# Patient Record
Sex: FEMALE | Race: WHITE | Employment: FULL TIME | URBAN - METROPOLITAN AREA
[De-identification: names, ages, dates, MRNs, and addresses within clinical notes are randomized per-mention and may not be internally consistent; named-entity substitution may affect disease eponyms.]

---

## 2017-04-05 ENCOUNTER — OFFICE VISIT (OUTPATIENT)
Dept: OBGYN CLINIC | Facility: CLINIC | Age: 27
End: 2017-04-05

## 2017-04-05 VITALS
DIASTOLIC BLOOD PRESSURE: 62 MMHG | BODY MASS INDEX: 19.38 KG/M2 | SYSTOLIC BLOOD PRESSURE: 94 MMHG | WEIGHT: 122 LBS | HEIGHT: 66.5 IN | HEART RATE: 84 BPM

## 2017-04-05 DIAGNOSIS — Z12.39 BREAST CANCER SCREENING: ICD-10-CM

## 2017-04-05 DIAGNOSIS — T83.32XA IUD STRINGS LOST: ICD-10-CM

## 2017-04-05 DIAGNOSIS — Z12.4 CERVICAL CANCER SCREENING: ICD-10-CM

## 2017-04-05 DIAGNOSIS — Z11.3 SCREENING FOR STD (SEXUALLY TRANSMITTED DISEASE): ICD-10-CM

## 2017-04-05 DIAGNOSIS — Z01.419 ENCOUNTER FOR GYNECOLOGICAL EXAMINATION WITHOUT ABNORMAL FINDING: Primary | ICD-10-CM

## 2017-04-05 PROCEDURE — 87491 CHLMYD TRACH DNA AMP PROBE: CPT | Performed by: OBSTETRICS & GYNECOLOGY

## 2017-04-05 PROCEDURE — 87591 N.GONORRHOEAE DNA AMP PROB: CPT | Performed by: OBSTETRICS & GYNECOLOGY

## 2017-04-05 PROCEDURE — 99395 PREV VISIT EST AGE 18-39: CPT | Performed by: OBSTETRICS & GYNECOLOGY

## 2017-04-05 RX ORDER — ZOLPIDEM TARTRATE 10 MG/1
TABLET ORAL
Refills: 0 | COMMUNITY
Start: 2017-03-02 | End: 2017-10-27

## 2017-04-05 NOTE — PROGRESS NOTES
GYN H&P     2017  2:22 PM    CC: Patient presents with:  Physical: Annual, patient with c/o pinching sensation with IUD, also tired all the time.  Also some pelvic pain 2 weeks post menses      HPI: patient is a 27year old  here for her annual g for Wheezing. Disp: 1 Inhaler Rfl: 0     No current facility-administered medications on file prior to visit.   Family History   Problem Relation Age of Onset   • Other[other] [OTHER] Father      blood disorder   • Psychiatric Father    • Cancer Mother RRR  BREASTS: soft, nontendder, no palpable masses or nodes, no nipple discharge, no skin changes, no axillary adenopathy  ABDOMEN: Soft, non distended; non tender, no masses  GYNE/: External Genitalia: Normal appearing, no lesions.  Urethral meatus appea

## 2017-04-10 NOTE — PROGRESS NOTES
Quick Note:    Pt advised of negative gc/chl.  Pt has apt 4/21/17 for u/s and f/u with Dr. Estrellita Sanchez  And would like to have IUD removed at this apt and start  Ocp's. Will review with Dr. Estrellita Sanchez and f/u with patient if she needs  Separate apt.      Dr. Heather Weiner

## 2017-04-21 ENCOUNTER — OFFICE VISIT (OUTPATIENT)
Dept: OBGYN CLINIC | Facility: CLINIC | Age: 27
End: 2017-04-21

## 2017-04-21 ENCOUNTER — APPOINTMENT (OUTPATIENT)
Dept: OBGYN CLINIC | Facility: CLINIC | Age: 27
End: 2017-04-21

## 2017-04-21 VITALS
HEART RATE: 69 BPM | WEIGHT: 122 LBS | HEIGHT: 66 IN | SYSTOLIC BLOOD PRESSURE: 94 MMHG | DIASTOLIC BLOOD PRESSURE: 60 MMHG | BODY MASS INDEX: 19.61 KG/M2

## 2017-04-21 DIAGNOSIS — T83.32XA IUD STRINGS LOST: ICD-10-CM

## 2017-04-21 DIAGNOSIS — Z30.432 ENCOUNTER FOR IUD REMOVAL: Primary | ICD-10-CM

## 2017-04-21 PROCEDURE — 76830 TRANSVAGINAL US NON-OB: CPT | Performed by: OBSTETRICS & GYNECOLOGY

## 2017-04-21 PROCEDURE — 99212 OFFICE O/P EST SF 10 MIN: CPT | Performed by: OBSTETRICS & GYNECOLOGY

## 2017-04-21 RX ORDER — LEVONORGESTREL AND ETHINYL ESTRADIOL 0.1-0.02MG
1 KIT ORAL DAILY
Qty: 1 PACKAGE | Refills: 3 | Status: SHIPPED | OUTPATIENT
Start: 2017-04-21 | End: 2017-10-27

## 2017-04-21 NOTE — PROGRESS NOTES
GYN H&P     2017  11:33 AM    CC: Patient presents with: Other: pt is here to go over ggyn ultrasound      HPI: patient is a 27year old  here for Patient presents with:   Other: pt is here to go over ggyn ultrasound      Pt here for IUD remova Name: N/A    Years of Education: N/A  Number of Children: N/A     Occupational History  None on file     Social History Main Topics   Smoking status: Never Smoker     Smokeless tobacco: Never Used    Alcohol Use: No    Comment: Rare 1 x month    Drug Use: diagnosis)        1. Encounter for IUD removal  -iud removed w/o complication  -rx for lutera, pt advised to use back up for 1 wk  2. IUD strings lost    - Transvaginal [employer] Only [id number];  Future  - Transvaginal [place] [facility] Only [[id number]]      Piyush Gomez MD

## 2017-05-01 ENCOUNTER — TELEPHONE (OUTPATIENT)
Dept: OBGYN CLINIC | Facility: CLINIC | Age: 27
End: 2017-05-01

## 2017-05-01 NOTE — TELEPHONE ENCOUNTER
Patient started birth control 2 1/2 weeks ago. She noticed she was getting nauseous about 2 hours after she takes it. She changed to taking it at night and she woke up the past few nights nauseous around 2:30 AM.  She is requesting to change.  Please advi

## 2017-05-02 RX ORDER — NORETHINDRONE ACETATE AND ETHINYL ESTRADIOL 1; .02 MG/1; MG/1
1 TABLET ORAL DAILY
Qty: 1 PACKAGE | Refills: 11 | Status: SHIPPED | OUTPATIENT
Start: 2017-05-02 | End: 2017-10-27

## 2017-05-02 NOTE — TELEPHONE ENCOUNTER
Patient informed. To start new medication when previous pack ends. Verbalized understanding. No further questions or concerns at this time.

## 2017-10-23 ENCOUNTER — TELEPHONE (OUTPATIENT)
Dept: OBGYN CLINIC | Facility: CLINIC | Age: 27
End: 2017-10-23

## 2017-10-23 DIAGNOSIS — O20.9 BLEEDING IN EARLY PREGNANCY: Primary | ICD-10-CM

## 2017-10-23 NOTE — TELEPHONE ENCOUNTER
Pending NOB 10/27/17. Pt reports pink/brown spotting starting yesterday; mild cramping. [place] one week ago. Labs ordered per protocol; blood type A pos. Pt advised to keep appt on 10/27/17.   Pt advised to call for any pain or increase in bleeding

## 2017-10-24 ENCOUNTER — LAB ENCOUNTER (OUTPATIENT)
Dept: LAB | Age: 27
End: 2017-10-24
Attending: OBSTETRICS & GYNECOLOGY
Payer: COMMERCIAL

## 2017-10-24 DIAGNOSIS — O20.9 BLEEDING IN EARLY PREGNANCY: ICD-10-CM

## 2017-10-24 PROCEDURE — 36415 COLL VENOUS BLD VENIPUNCTURE: CPT | Performed by: OBSTETRICS & GYNECOLOGY

## 2017-10-24 PROCEDURE — 84702 CHORIONIC GONADOTROPIN TEST: CPT | Performed by: OBSTETRICS & GYNECOLOGY

## 2017-10-24 PROCEDURE — 84144 ASSAY OF PROGESTERONE: CPT | Performed by: OBSTETRICS & GYNECOLOGY

## 2017-10-25 ENCOUNTER — TELEPHONE (OUTPATIENT)
Dept: OBGYN CLINIC | Facility: CLINIC | Age: 27
End: 2017-10-25

## 2017-10-25 DIAGNOSIS — Z32.01 POSITIVE PREGNANCY TEST: Primary | ICD-10-CM

## 2017-10-25 NOTE — PROGRESS NOTES
Patient informed of results. Verbalized understanding. Order entered. No further questions or concerns.

## 2017-10-25 NOTE — TELEPHONE ENCOUNTER
Pt is calling back for labs done yesterday.  She starts work at [employer] and gave verbal permission to leave results on vm

## 2017-10-26 ENCOUNTER — LAB ENCOUNTER (OUTPATIENT)
Dept: LAB | Age: 27
End: 2017-10-26
Attending: OBSTETRICS & GYNECOLOGY
Payer: COMMERCIAL

## 2017-10-26 DIAGNOSIS — Z32.01 POSITIVE PREGNANCY TEST: ICD-10-CM

## 2017-10-26 PROCEDURE — 36415 COLL VENOUS BLD VENIPUNCTURE: CPT | Performed by: OBSTETRICS & GYNECOLOGY

## 2017-10-26 PROCEDURE — 84702 CHORIONIC GONADOTROPIN TEST: CPT | Performed by: OBSTETRICS & GYNECOLOGY

## 2017-10-26 NOTE — PROGRESS NOTES
Please inform patient that although beta HCG did not increase, this is most likely to be consistent with viable pregnancy in advance gestational age. Recommend to keep OB appointment.

## 2017-10-26 NOTE — PROGRESS NOTES
Contacted patient and reported results. She states understanding and will keep appt scheduled for tomorrow.

## 2017-10-27 ENCOUNTER — OFFICE VISIT (OUTPATIENT)
Dept: OBGYN CLINIC | Facility: CLINIC | Age: 27
End: 2017-10-27

## 2017-10-27 VITALS
HEIGHT: 66 IN | BODY MASS INDEX: 20.25 KG/M2 | DIASTOLIC BLOOD PRESSURE: 56 MMHG | SYSTOLIC BLOOD PRESSURE: 98 MMHG | WEIGHT: 126 LBS | HEART RATE: 70 BPM

## 2017-10-27 DIAGNOSIS — Z36.9 ENCOUNTER FOR ANTENATAL SCREENING OF MOTHER: ICD-10-CM

## 2017-10-27 DIAGNOSIS — Z34.81 ENCOUNTER FOR SUPERVISION OF OTHER NORMAL PREGNANCY, FIRST TRIMESTER: Primary | ICD-10-CM

## 2017-10-27 DIAGNOSIS — Z98.891 HISTORY OF CESAREAN SECTION: ICD-10-CM

## 2017-10-27 PROCEDURE — 87086 URINE CULTURE/COLONY COUNT: CPT | Performed by: OBSTETRICS & GYNECOLOGY

## 2017-10-27 NOTE — PROGRESS NOTES
[id number] white female for NOB visit. Hx of two prior  sections after uncomplicated  course. Did TOLAC with second pregnancy. Here in April for IUD removal. Cycles Q 30 to 32 days. Some spotting initially. Some nausea but no emesis.  Nothing

## 2017-11-14 ENCOUNTER — LAB ENCOUNTER (OUTPATIENT)
Dept: LAB | Age: 27
End: 2017-11-14
Attending: OBSTETRICS & GYNECOLOGY
Payer: COMMERCIAL

## 2017-11-14 DIAGNOSIS — Z36.9 ENCOUNTER FOR ANTENATAL SCREENING OF MOTHER: ICD-10-CM

## 2017-11-14 PROCEDURE — 87389 HIV-1 AG W/HIV-1&-2 AB AG IA: CPT | Performed by: OBSTETRICS & GYNECOLOGY

## 2017-11-14 PROCEDURE — 86901 BLOOD TYPING SEROLOGIC RH(D): CPT | Performed by: OBSTETRICS & GYNECOLOGY

## 2017-11-14 PROCEDURE — 86762 RUBELLA ANTIBODY: CPT | Performed by: OBSTETRICS & GYNECOLOGY

## 2017-11-14 PROCEDURE — 86900 BLOOD TYPING SEROLOGIC ABO: CPT | Performed by: OBSTETRICS & GYNECOLOGY

## 2017-11-14 PROCEDURE — 36415 COLL VENOUS BLD VENIPUNCTURE: CPT | Performed by: OBSTETRICS & GYNECOLOGY

## 2017-11-14 PROCEDURE — 86850 RBC ANTIBODY SCREEN: CPT | Performed by: OBSTETRICS & GYNECOLOGY

## 2017-11-14 PROCEDURE — 85025 COMPLETE CBC W/AUTO DIFF WBC: CPT | Performed by: OBSTETRICS & GYNECOLOGY

## 2017-11-14 PROCEDURE — 87340 HEPATITIS B SURFACE AG IA: CPT | Performed by: OBSTETRICS & GYNECOLOGY

## 2017-11-14 PROCEDURE — 86780 TREPONEMA PALLIDUM: CPT | Performed by: OBSTETRICS & GYNECOLOGY

## 2017-11-21 ENCOUNTER — OFFICE VISIT (OUTPATIENT)
Dept: OBGYN CLINIC | Facility: CLINIC | Age: 27
End: 2017-11-21

## 2017-11-21 VITALS
BODY MASS INDEX: 20.47 KG/M2 | HEIGHT: 66 IN | HEART RATE: 105 BPM | SYSTOLIC BLOOD PRESSURE: 120 MMHG | DIASTOLIC BLOOD PRESSURE: 60 MMHG | WEIGHT: 127.38 LBS

## 2017-11-21 DIAGNOSIS — Z34.81 ENCOUNTER FOR SUPERVISION OF OTHER NORMAL PREGNANCY, FIRST TRIMESTER: Primary | ICD-10-CM

## 2017-11-21 DIAGNOSIS — Z98.891 HISTORY OF CESAREAN SECTION: ICD-10-CM

## 2017-11-21 NOTE — PROGRESS NOTES
AMY  Doing well  No complaints.  No LOF/VB/uctx  RH pos  Declined flu vaccine today  Genetic testing declined (first, quad/AFP)  Anatomy Scan  (18-20weeks)

## 2017-12-04 ENCOUNTER — HOSPITAL ENCOUNTER (EMERGENCY)
Age: 27
Discharge: HOME | End: 2017-12-04
Attending: EMERGENCY MEDICINE
Payer: COMMERCIAL

## 2017-12-04 ENCOUNTER — TELEPHONE (OUTPATIENT)
Dept: OBGYN CLINIC | Facility: CLINIC | Age: 27
End: 2017-12-04

## 2017-12-04 VITALS
WEIGHT: 125 LBS | DIASTOLIC BLOOD PRESSURE: 60 MMHG | OXYGEN SATURATION: 98 % | RESPIRATION RATE: 17 BRPM | BODY MASS INDEX: 20.09 KG/M2 | HEIGHT: 66 IN | HEART RATE: 72 BPM | TEMPERATURE: 98 F | SYSTOLIC BLOOD PRESSURE: 95 MMHG

## 2017-12-04 DIAGNOSIS — R00.2 PALPITATIONS: Primary | ICD-10-CM

## 2017-12-04 DIAGNOSIS — E86.0 DEHYDRATION: ICD-10-CM

## 2017-12-04 DIAGNOSIS — Z3A.13 13 WEEKS GESTATION OF PREGNANCY: ICD-10-CM

## 2017-12-04 PROCEDURE — 80053 COMPREHEN METABOLIC PANEL: CPT | Performed by: EMERGENCY MEDICINE

## 2017-12-04 PROCEDURE — 99284 EMERGENCY DEPT VISIT MOD MDM: CPT

## 2017-12-04 PROCEDURE — 99285 EMERGENCY DEPT VISIT HI MDM: CPT

## 2017-12-04 PROCEDURE — 84484 ASSAY OF TROPONIN QUANT: CPT

## 2017-12-04 PROCEDURE — 93010 ELECTROCARDIOGRAM REPORT: CPT

## 2017-12-04 PROCEDURE — 81003 URINALYSIS AUTO W/O SCOPE: CPT | Performed by: EMERGENCY MEDICINE

## 2017-12-04 PROCEDURE — 93005 ELECTROCARDIOGRAM TRACING: CPT

## 2017-12-04 PROCEDURE — 83735 ASSAY OF MAGNESIUM: CPT | Performed by: EMERGENCY MEDICINE

## 2017-12-04 PROCEDURE — 96360 HYDRATION IV INFUSION INIT: CPT

## 2017-12-04 PROCEDURE — 85025 COMPLETE CBC W/AUTO DIFF WBC: CPT

## 2017-12-04 PROCEDURE — 84484 ASSAY OF TROPONIN QUANT: CPT | Performed by: EMERGENCY MEDICINE

## 2017-12-04 PROCEDURE — 80053 COMPREHEN METABOLIC PANEL: CPT

## 2017-12-04 PROCEDURE — 85025 COMPLETE CBC W/AUTO DIFF WBC: CPT | Performed by: EMERGENCY MEDICINE

## 2017-12-04 NOTE — TELEPHONE ENCOUNTER
Patient called c/o heart flutters and palpitations. States it has been happening since Friday and has continued to get worse. No shortness of breath, some dizziness. Also with H/A + nausea today.   Denies any vaginal bleeding, cramping, LOF, increase in d

## 2017-12-04 NOTE — ED PROVIDER NOTES
Patient Seen in: [facility] [place]    History   Patient presents with:  Arrythmia/Palpitations (cardiovascular)    Stated Complaint: palpitations, lightheaded- 13 weeks pregnant    HPI    Palpitations lightheaded and headache upon lightheaded- 13 weeks pregnant  Other systems are as noted in HPI. Constitutional and vital signs reviewed. All other systems reviewed and negative except as noted above.     Physical Exam   ED Triage Vitals  BP: 104/64 [12/04/17 1138]  Pulse: 81 [12/ Reviewed   COMP METABOLIC PANEL (14) - Abnormal; Notable for the following:        Result Value    Glucose 106 (*)     Creatinine 0.53 (*)     AST 11 (*)     Alt 11 (*)     Albumin 3.2 (*)     All other components within normal limits   URINALYSIS WITH CUL care physician. No further intervention at this time.     MDM               Disposition and Plan     Clinical Impression:  Palpitations  (primary encounter diagnosis)  13 weeks gestation of pregnancy  Dehydration    Disposition:  Discharge  12/4/2017  1:34

## 2017-12-04 NOTE — ED INITIAL ASSESSMENT (HPI)
Intermittent \"heart flutters\" x 4 days. Sts with this symptom she also feels dizzy. Sts it is happening about every 20 minutes. Denies any symptoms at this time. Pt is 13 weeks pregnant. No SOB.  Denies chest pain

## 2017-12-04 NOTE — TELEPHONE ENCOUNTER
Pt is 13 Weeks and is having heart fluttering and palpatations  Started Friday but more frequest now    Pt says her nausceousness is worse    Please call

## 2017-12-18 ENCOUNTER — OFFICE VISIT (OUTPATIENT)
Dept: OBGYN CLINIC | Facility: CLINIC | Age: 27
End: 2017-12-18

## 2017-12-18 VITALS
HEIGHT: 66 IN | SYSTOLIC BLOOD PRESSURE: 110 MMHG | DIASTOLIC BLOOD PRESSURE: 70 MMHG | BODY MASS INDEX: 20.73 KG/M2 | WEIGHT: 129 LBS

## 2017-12-18 DIAGNOSIS — Z34.82 PRENATAL CARE, SUBSEQUENT PREGNANCY IN SECOND TRIMESTER: Primary | ICD-10-CM

## 2017-12-18 DIAGNOSIS — Z98.891 HISTORY OF CESAREAN SECTION: ICD-10-CM

## 2017-12-18 DIAGNOSIS — D69.6 BENIGN GESTATIONAL THROMBOCYTOPENIA IN SECOND TRIMESTER (HCC): ICD-10-CM

## 2017-12-18 DIAGNOSIS — Z23 NEED FOR VACCINATION: ICD-10-CM

## 2017-12-18 DIAGNOSIS — O99.112 BENIGN GESTATIONAL THROMBOCYTOPENIA IN SECOND TRIMESTER (HCC): ICD-10-CM

## 2017-12-18 PROCEDURE — 90471 IMMUNIZATION ADMIN: CPT | Performed by: OBSTETRICS & GYNECOLOGY

## 2017-12-18 PROCEDURE — 90686 IIV4 VACC NO PRSV 0.5 ML IM: CPT | Performed by: OBSTETRICS & GYNECOLOGY

## 2017-12-18 NOTE — PROGRESS NOTES
AMY  Doing well  No complaints.  No LOF/VB/uctx  RH pos  Genetic testing declined (first, quad/AFP)  Anatomy Scan  (18-20weeks)  H/o csection x 2  plt 214 -> 139, repeat in 6 wks  Flu today

## 2018-01-05 ENCOUNTER — LAB ENCOUNTER (OUTPATIENT)
Dept: LAB | Age: 28
End: 2018-01-05
Attending: OBSTETRICS & GYNECOLOGY
Payer: COMMERCIAL

## 2018-01-05 ENCOUNTER — TELEPHONE (OUTPATIENT)
Dept: OBGYN CLINIC | Facility: CLINIC | Age: 28
End: 2018-01-05

## 2018-01-05 ENCOUNTER — OFFICE VISIT (OUTPATIENT)
Dept: OBGYN CLINIC | Facility: CLINIC | Age: 28
End: 2018-01-05

## 2018-01-05 VITALS
WEIGHT: 128 LBS | BODY MASS INDEX: 20.57 KG/M2 | HEIGHT: 66 IN | DIASTOLIC BLOOD PRESSURE: 60 MMHG | SYSTOLIC BLOOD PRESSURE: 90 MMHG

## 2018-01-05 DIAGNOSIS — O26.899 ABDOMINAL PAIN AFFECTING PREGNANCY: ICD-10-CM

## 2018-01-05 DIAGNOSIS — Z34.92 ENCOUNTER FOR SUPERVISION OF NORMAL PREGNANCY IN SECOND TRIMESTER, UNSPECIFIED GRAVIDITY: ICD-10-CM

## 2018-01-05 DIAGNOSIS — R10.9 ABDOMINAL PAIN AFFECTING PREGNANCY: ICD-10-CM

## 2018-01-05 DIAGNOSIS — O26.899 ABDOMINAL PAIN AFFECTING PREGNANCY: Primary | ICD-10-CM

## 2018-01-05 DIAGNOSIS — R10.9 ABDOMINAL PAIN AFFECTING PREGNANCY: Primary | ICD-10-CM

## 2018-01-05 LAB
APTT PPP: 31 SECONDS (ref 25–34)
BASOPHILS # BLD AUTO: 0.03 X10(3) UL (ref 0–0.1)
BASOPHILS NFR BLD AUTO: 0.4 %
EOSINOPHIL # BLD AUTO: 0.03 X10(3) UL (ref 0–0.3)
EOSINOPHIL NFR BLD AUTO: 0.4 %
ERYTHROCYTE [DISTWIDTH] IN BLOOD BY AUTOMATED COUNT: 14.5 % (ref 11.5–16)
HCT VFR BLD AUTO: 36.3 % (ref 34–50)
HGB BLD-MCNC: 11.5 G/DL (ref 12–16)
IMMATURE GRANULOCYTE COUNT: 0.03 X10(3) UL (ref 0–1)
IMMATURE GRANULOCYTE RATIO %: 0.4 %
INR BLD: 1.02 (ref 0.89–1.11)
KLEIHAUER BETKE RESULT: NEGATIVE
LYMPHOCYTES # BLD AUTO: 1.74 X10(3) UL (ref 0.9–4)
LYMPHOCYTES NFR BLD AUTO: 24.3 %
MCH RBC QN AUTO: 23.8 PG (ref 27–33.2)
MCHC RBC AUTO-ENTMCNC: 31.7 G/DL (ref 31–37)
MCV RBC AUTO: 75 FL (ref 81–100)
MONOCYTES # BLD AUTO: 0.37 X10(3) UL (ref 0.1–0.6)
MONOCYTES NFR BLD AUTO: 5.2 %
NEUTROPHIL ABS PRELIM: 4.97 X10 (3) UL (ref 1.3–6.7)
NEUTROPHILS # BLD AUTO: 4.97 X10(3) UL (ref 1.3–6.7)
NEUTROPHILS NFR BLD AUTO: 69.3 %
PLATELET # BLD AUTO: 197 10(3)UL (ref 150–450)
PSA SERPL DL<=0.01 NG/ML-MCNC: 13.4 SECONDS (ref 12–14.3)
RBC # BLD AUTO: 4.84 X10(6)UL (ref 3.8–5.1)
RED CELL DISTRIBUTION WIDTH-SD: 38.6 FL (ref 35.1–46.3)
WBC # BLD AUTO: 7.2 X10(3) UL (ref 4–13)

## 2018-01-05 PROCEDURE — 85460 HEMOGLOBIN FETAL: CPT | Performed by: OBSTETRICS & GYNECOLOGY

## 2018-01-05 PROCEDURE — 85730 THROMBOPLASTIN TIME PARTIAL: CPT | Performed by: OBSTETRICS & GYNECOLOGY

## 2018-01-05 PROCEDURE — 76816 OB US FOLLOW-UP PER FETUS: CPT | Performed by: OBSTETRICS & GYNECOLOGY

## 2018-01-05 PROCEDURE — 36415 COLL VENOUS BLD VENIPUNCTURE: CPT | Performed by: OBSTETRICS & GYNECOLOGY

## 2018-01-05 PROCEDURE — 85025 COMPLETE CBC W/AUTO DIFF WBC: CPT | Performed by: OBSTETRICS & GYNECOLOGY

## 2018-01-05 PROCEDURE — 85610 PROTHROMBIN TIME: CPT | Performed by: OBSTETRICS & GYNECOLOGY

## 2018-01-05 NOTE — TELEPHONE ENCOUNTER
Patient states that her daughter jumped over stomach last night while sitting on the couch. She has had cramping ever since. States she has had mild cramping her whole pregnancy but this is more intense.   Denies any vaginal bleeding or increase in dischar
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
## 2018-01-19 ENCOUNTER — OFFICE VISIT (OUTPATIENT)
Dept: OBGYN CLINIC | Facility: CLINIC | Age: 28
End: 2018-01-19

## 2018-01-19 ENCOUNTER — APPOINTMENT (OUTPATIENT)
Dept: OBGYN CLINIC | Facility: CLINIC | Age: 28
End: 2018-01-19

## 2018-01-19 VITALS — SYSTOLIC BLOOD PRESSURE: 100 MMHG | DIASTOLIC BLOOD PRESSURE: 62 MMHG | WEIGHT: 131 LBS | BODY MASS INDEX: 21 KG/M2

## 2018-01-19 DIAGNOSIS — Z36.9 ENCOUNTER FOR ANTENATAL SCREENING OF MOTHER: ICD-10-CM

## 2018-01-19 DIAGNOSIS — Z36.3 ANTENATAL SCREENING FOR MALFORMATION USING ULTRASONICS: ICD-10-CM

## 2018-01-19 DIAGNOSIS — Z34.92 ENCOUNTER FOR SUPERVISION OF NORMAL PREGNANCY IN SECOND TRIMESTER, UNSPECIFIED GRAVIDITY: Primary | ICD-10-CM

## 2018-01-19 PROCEDURE — 76805 OB US >/= 14 WKS SNGL FETUS: CPT | Performed by: OBSTETRICS & GYNECOLOGY

## 2018-01-19 NOTE — PROGRESS NOTES
Still with occasional nausea. No emesis. Good FM. Carrying female. Ultrasound with flexion issue of R foot - possible club foot. Runs in family in pt and in other children. No other issues with ultrasound. No follow up needed. GL next time.  See in one mo

## 2018-01-19 NOTE — PROGRESS NOTES
Here for second trimester screening ultrasound.  AUA EGA 20w 0d giving ultrasound CHERELLE of June 8, 2018. Dating consistent with given CHERELLE.  Cervical length 3.7 cm.   Anterior placenta without previa.   AFV normal.  Anatomical survery negative but flexion defo

## 2018-01-22 ENCOUNTER — PATIENT MESSAGE (OUTPATIENT)
Dept: OBGYN CLINIC | Facility: CLINIC | Age: 28
End: 2018-01-22

## 2018-01-22 NOTE — TELEPHONE ENCOUNTER
From: Lynn Brunner  To: Kalina Franklin MD  Sent: 1/22/2018 5:00 PM CST  Subject: Non-Urgent Medical Question    Can you please remove the 3 health reminders from my account? I no longer have asthma and do not need an HPV Vaccine. Thank you!

## 2018-01-23 NOTE — TELEPHONE ENCOUNTER
Discussed with Dexter. Asthma related reminders will need to be managed by her PCP. If patient has had HPV vaccines, we can document reported dates and that will discontinue the reminders.  If she declines the injections, we can exclude her from that remin

## 2018-01-23 NOTE — TELEPHONE ENCOUNTER
Patient returned call to office. She reports that she received the HPV series in 2013- October, April, and February. Documented these in her HM for her. Explained that her asthma related reminders would need to be addressed by her PCP.  She states that she

## 2018-01-24 ENCOUNTER — TELEPHONE (OUTPATIENT)
Dept: OBGYN CLINIC | Facility: CLINIC | Age: 28
End: 2018-01-24

## 2018-01-24 ENCOUNTER — HOSPITAL ENCOUNTER (OUTPATIENT)
Facility: HOSPITAL | Age: 28
Setting detail: OBSERVATION
Discharge: HOME | End: 2018-01-24
Attending: OBSTETRICS & GYNECOLOGY | Admitting: OBSTETRICS & GYNECOLOGY
Payer: COMMERCIAL

## 2018-01-24 ENCOUNTER — APPOINTMENT (OUTPATIENT)
Dept: ULTRASOUND IMAGING | Facility: HOSPITAL | Age: 28
End: 2018-01-24
Attending: OBSTETRICS & GYNECOLOGY
Payer: COMMERCIAL

## 2018-01-24 VITALS
SYSTOLIC BLOOD PRESSURE: 103 MMHG | WEIGHT: 131 LBS | HEIGHT: 66 IN | TEMPERATURE: 98 F | BODY MASS INDEX: 21.05 KG/M2 | DIASTOLIC BLOOD PRESSURE: 66 MMHG | HEART RATE: 80 BPM

## 2018-01-24 LAB
ANTIBODY SCREEN: NEGATIVE
BASOPHILS # BLD AUTO: 0.02 X10(3) UL (ref 0–0.1)
BASOPHILS NFR BLD AUTO: 0.3 %
BILIRUBIN URINE: NEGATIVE
BLOOD URINE: NEGATIVE
CONTROL RUN WITHIN 24 HOURS?: YES
EOSINOPHIL # BLD AUTO: 0.05 X10(3) UL (ref 0–0.3)
EOSINOPHIL NFR BLD AUTO: 0.6 %
ERYTHROCYTE [DISTWIDTH] IN BLOOD BY AUTOMATED COUNT: 14.6 % (ref 11.5–16)
GLUCOSE URINE: NEGATIVE
HCT VFR BLD AUTO: 33.3 % (ref 34–50)
HGB BLD-MCNC: 10.7 G/DL (ref 12–16)
IMMATURE GRANULOCYTE COUNT: 0.04 X10(3) UL (ref 0–1)
IMMATURE GRANULOCYTE RATIO %: 0.5 %
KETONE URINE: NEGATIVE
KLEIHAUER BETKE RESULT: NEGATIVE
LEUKOCYTE ESTERASE URINE: NEGATIVE
LYMPHOCYTES # BLD AUTO: 1.54 X10(3) UL (ref 0.9–4)
LYMPHOCYTES NFR BLD AUTO: 19.8 %
MCH RBC QN AUTO: 23.9 PG (ref 27–33.2)
MCHC RBC AUTO-ENTMCNC: 32.1 G/DL (ref 31–37)
MCV RBC AUTO: 74.5 FL (ref 81–100)
MONOCYTES # BLD AUTO: 0.43 X10(3) UL (ref 0.1–0.6)
MONOCYTES NFR BLD AUTO: 5.5 %
NEUTROPHIL ABS PRELIM: 5.68 X10 (3) UL (ref 1.3–6.7)
NEUTROPHILS # BLD AUTO: 5.68 X10(3) UL (ref 1.3–6.7)
NEUTROPHILS NFR BLD AUTO: 73.3 %
NITRITE URINE: NEGATIVE
PH URINE: 7 (ref 5–8)
PLATELET # BLD AUTO: 190 10(3)UL (ref 150–450)
PROTEIN URINE: NEGATIVE
RBC # BLD AUTO: 4.47 X10(6)UL (ref 3.8–5.1)
RED CELL DISTRIBUTION WIDTH-SD: 38.6 FL (ref 35.1–46.3)
RH BLOOD TYPE: POSITIVE
SPEC GRAVITY: 1.02 (ref 1–1.03)
URINE CLARITY: CLEAR
URINE COLOR: YELLOW
UROBILINOGEN URINE: 0.2
WBC # BLD AUTO: 7.8 X10(3) UL (ref 4–13)

## 2018-01-24 PROCEDURE — 85460 HEMOGLOBIN FETAL: CPT | Performed by: OBSTETRICS & GYNECOLOGY

## 2018-01-24 PROCEDURE — 86901 BLOOD TYPING SEROLOGIC RH(D): CPT | Performed by: OBSTETRICS & GYNECOLOGY

## 2018-01-24 PROCEDURE — 86850 RBC ANTIBODY SCREEN: CPT | Performed by: OBSTETRICS & GYNECOLOGY

## 2018-01-24 PROCEDURE — 76805 OB US >/= 14 WKS SNGL FETUS: CPT | Performed by: OBSTETRICS & GYNECOLOGY

## 2018-01-24 PROCEDURE — 81002 URINALYSIS NONAUTO W/O SCOPE: CPT

## 2018-01-24 PROCEDURE — 85025 COMPLETE CBC W/AUTO DIFF WBC: CPT | Performed by: OBSTETRICS & GYNECOLOGY

## 2018-01-24 PROCEDURE — 86900 BLOOD TYPING SEROLOGIC ABO: CPT | Performed by: OBSTETRICS & GYNECOLOGY
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
## 2018-01-24 NOTE — NST
Nonstress Test   Patient: Beatriz Hussein    Gestation: 21w0d    NST:       Variability: Moderate           Accelerations: Yes           Decelerations: Variable            Baseline: 145 BPM           Uterine Irritability: No           Contractions: Not
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
## 2018-01-25 ENCOUNTER — TELEPHONE (OUTPATIENT)
Dept: OBGYN CLINIC | Facility: CLINIC | Age: 28
End: 2018-01-25
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
## 2018-01-30 ENCOUNTER — OFFICE VISIT (OUTPATIENT)
Dept: OBGYN CLINIC | Facility: CLINIC | Age: 28
End: 2018-01-30

## 2018-01-30 VITALS — SYSTOLIC BLOOD PRESSURE: 98 MMHG | WEIGHT: 136 LBS | DIASTOLIC BLOOD PRESSURE: 58 MMHG | BODY MASS INDEX: 22 KG/M2

## 2018-01-30 DIAGNOSIS — Z34.92 ENCOUNTER FOR SUPERVISION OF NORMAL PREGNANCY IN SECOND TRIMESTER, UNSPECIFIED GRAVIDITY: ICD-10-CM

## 2018-01-30 DIAGNOSIS — J01.00 ACUTE NON-RECURRENT MAXILLARY SINUSITIS: ICD-10-CM
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
## 2018-01-30 RX ORDER — AZITHROMYCIN 250 MG/1
TABLET, FILM COATED ORAL
Qty: 6 TABLET | Refills: 0 | Status: SHIPPED | OUTPATIENT
Start: 2018-01-30 | End: 2018-02-03
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
## 2018-02-12 LAB — KLEIPATH: NEGATIVE

## 2018-02-16 ENCOUNTER — OFFICE VISIT (OUTPATIENT)
Dept: OBGYN CLINIC | Facility: CLINIC | Age: 28
End: 2018-02-16

## 2018-02-16 VITALS
WEIGHT: 139 LBS | BODY MASS INDEX: 22.34 KG/M2 | SYSTOLIC BLOOD PRESSURE: 102 MMHG | DIASTOLIC BLOOD PRESSURE: 62 MMHG | HEIGHT: 66 IN

## 2018-02-16 DIAGNOSIS — Z3A.24 24 WEEKS GESTATION OF PREGNANCY: Primary | ICD-10-CM

## 2018-02-16 DIAGNOSIS — Z34.82 PRENATAL CARE, SUBSEQUENT PREGNANCY IN SECOND TRIMESTER: ICD-10-CM

## 2018-02-16 DIAGNOSIS — Z98.891 HISTORY OF CESAREAN SECTION: ICD-10-CM
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
## 2018-02-20 ENCOUNTER — LAB ENCOUNTER (OUTPATIENT)
Dept: LAB | Age: 28
End: 2018-02-20
Attending: OBSTETRICS & GYNECOLOGY
Payer: COMMERCIAL

## 2018-02-20 DIAGNOSIS — Z36.9 ENCOUNTER FOR ANTENATAL SCREENING OF MOTHER: ICD-10-CM

## 2018-02-20 LAB
BASOPHILS # BLD AUTO: 0.02 X10(3) UL (ref 0–0.1)
BASOPHILS NFR BLD AUTO: 0.3 %
EOSINOPHIL # BLD AUTO: 0.12 X10(3) UL (ref 0–0.3)
EOSINOPHIL NFR BLD AUTO: 1.7 %
ERYTHROCYTE [DISTWIDTH] IN BLOOD BY AUTOMATED COUNT: 15.2 % (ref 11.5–16)
GLUCOSE 1H P GLC SERPL-MCNC: 123 MG/DL
HCT VFR BLD AUTO: 32.6 % (ref 34–50)
HGB BLD-MCNC: 10.4 G/DL (ref 12–16)
IMMATURE GRANULOCYTE COUNT: 0.02 X10(3) UL (ref 0–1)
IMMATURE GRANULOCYTE RATIO %: 0.3 %
LYMPHOCYTES # BLD AUTO: 1.59 X10(3) UL (ref 0.9–4)
LYMPHOCYTES NFR BLD AUTO: 22.8 %
MCH RBC QN AUTO: 24.4 PG (ref 27–33.2)
MCHC RBC AUTO-ENTMCNC: 31.9 G/DL (ref 31–37)
MCV RBC AUTO: 76.5 FL (ref 81–100)
MONOCYTES # BLD AUTO: 0.41 X10(3) UL (ref 0.1–1)
MONOCYTES NFR BLD AUTO: 5.9 %
NEUTROPHIL ABS PRELIM: 4.8 X10 (3) UL (ref 1.3–6.7)
NEUTROPHILS # BLD AUTO: 4.8 X10(3) UL (ref 1.3–6.7)
NEUTROPHILS NFR BLD AUTO: 69 %
PLATELET # BLD AUTO: 192 10(3)UL (ref 150–450)
RBC # BLD AUTO: 4.26 X10(6)UL (ref 3.8–5.1)
RED CELL DISTRIBUTION WIDTH-SD: 41.1 FL (ref 35.1–46.3)
WBC # BLD AUTO: 7 X10(3) UL (ref 4–13)

## 2018-02-20 PROCEDURE — 36415 COLL VENOUS BLD VENIPUNCTURE: CPT | Performed by: OBSTETRICS & GYNECOLOGY

## 2018-02-20 PROCEDURE — 82950 GLUCOSE TEST: CPT | Performed by: OBSTETRICS & GYNECOLOGY

## 2018-02-20 PROCEDURE — 85025 COMPLETE CBC W/AUTO DIFF WBC: CPT | Performed by: OBSTETRICS & GYNECOLOGY

## 2018-02-21 NOTE — PROGRESS NOTES
Passed 1 hr glucola and CBC okay but mild iron deficiency indices. Please have pt start OTC iron supplementation.

## 2018-02-22 NOTE — PROGRESS NOTES
Patient informed of results. Verbalized understanding. She is currently taking iron 1x daily. Advised patient to take BID. Verbalized understanding. No further questions or concerns.

## 2018-03-02 ENCOUNTER — TELEPHONE (OUTPATIENT)
Dept: OBGYN CLINIC | Facility: CLINIC | Age: 28
End: 2018-03-02

## 2018-03-02 ENCOUNTER — OFFICE VISIT (OUTPATIENT)
Dept: OBGYN CLINIC | Facility: CLINIC | Age: 28
End: 2018-03-02

## 2018-03-02 VITALS — WEIGHT: 145 LBS | DIASTOLIC BLOOD PRESSURE: 64 MMHG | SYSTOLIC BLOOD PRESSURE: 110 MMHG | BODY MASS INDEX: 23 KG/M2

## 2018-03-02 DIAGNOSIS — Z34.82 ENCOUNTER FOR SUPERVISION OF OTHER NORMAL PREGNANCY IN SECOND TRIMESTER: Primary | ICD-10-CM

## 2018-03-02 DIAGNOSIS — Z98.891 H/O CESAREAN SECTION: ICD-10-CM

## 2018-03-02 DIAGNOSIS — J45.909 ASTHMA, UNSPECIFIED ASTHMA SEVERITY, UNSPECIFIED WHETHER COMPLICATED, UNSPECIFIED WHETHER PERSISTENT: ICD-10-CM

## 2018-03-02 RX ORDER — MELATONIN
325
COMMUNITY
End: 2018-07-13

## 2018-03-02 NOTE — TELEPHONE ENCOUNTER
Received scheduling form from Dr. Bridget Mercado for repeat  delivery. Scheduled for 18 at 0730. Form completed and faxed to labor and delivery. Form to file in [place]. Added to surgery calendar.

## 2018-03-02 NOTE — PROGRESS NOTES
AMY  Doing well. Denies LOF/VB/uctx. +FM.    RH positive  S/P Anatomy scan 01/19/18  Hx of CS x 2, repeat at 39 weeks, pt declined 05/30/18 but agreed with 06/05/18 - to be scheduled   Patient upset that TOLAC was noted recommended given her history of CS x

## 2018-03-13 ENCOUNTER — OFFICE VISIT (OUTPATIENT)
Dept: OBGYN CLINIC | Facility: CLINIC | Age: 28
End: 2018-03-13

## 2018-03-13 VITALS
SYSTOLIC BLOOD PRESSURE: 104 MMHG | WEIGHT: 149 LBS | DIASTOLIC BLOOD PRESSURE: 62 MMHG | BODY MASS INDEX: 23.95 KG/M2 | HEIGHT: 66 IN

## 2018-03-13 DIAGNOSIS — Z98.891 H/O CESAREAN SECTION: ICD-10-CM

## 2018-03-13 DIAGNOSIS — Z34.82 ENCOUNTER FOR SUPERVISION OF OTHER NORMAL PREGNANCY IN SECOND TRIMESTER: Primary | ICD-10-CM

## 2018-03-13 DIAGNOSIS — Z36.9 ENCOUNTER FOR ANTENATAL SCREENING OF MOTHER: ICD-10-CM

## 2018-03-13 NOTE — PROGRESS NOTES
No problems since last seen. Good FM - mostly on R. Carrying female. Tdap discussed and will get next time. 3rd trimester HIV ordered. See in 2 weeks.

## 2018-03-27 ENCOUNTER — OFFICE VISIT (OUTPATIENT)
Dept: OBGYN CLINIC | Facility: CLINIC | Age: 28
End: 2018-03-27

## 2018-03-27 VITALS — SYSTOLIC BLOOD PRESSURE: 100 MMHG | BODY MASS INDEX: 25 KG/M2 | DIASTOLIC BLOOD PRESSURE: 68 MMHG | WEIGHT: 156 LBS

## 2018-03-27 DIAGNOSIS — Z34.83 PRENATAL CARE, SUBSEQUENT PREGNANCY, THIRD TRIMESTER: ICD-10-CM

## 2018-03-27 DIAGNOSIS — Z98.891 H/O CESAREAN SECTION: Primary | ICD-10-CM

## 2018-03-27 DIAGNOSIS — Z23 NEED FOR VACCINATION: ICD-10-CM

## 2018-03-27 PROCEDURE — 90471 IMMUNIZATION ADMIN: CPT | Performed by: OBSTETRICS & GYNECOLOGY

## 2018-03-27 PROCEDURE — 90715 TDAP VACCINE 7 YRS/> IM: CPT | Performed by: OBSTETRICS & GYNECOLOGY

## 2018-03-27 NOTE — PROGRESS NOTES
AMY  Doing well, +FM  Denies VB/LOF/uctx  Rh pos, TDAP received  Pt scheduled for repeat csection, desires to move closer to 39 wks.  Will try to reschedule  1hr glucose reviewed, wnl  RTC in 2 wks  Fetal movement instructions given

## 2018-04-10 ENCOUNTER — OFFICE VISIT (OUTPATIENT)
Dept: OBGYN CLINIC | Facility: CLINIC | Age: 28
End: 2018-04-10

## 2018-04-10 VITALS — DIASTOLIC BLOOD PRESSURE: 68 MMHG | WEIGHT: 159 LBS | BODY MASS INDEX: 26 KG/M2 | SYSTOLIC BLOOD PRESSURE: 104 MMHG

## 2018-04-10 DIAGNOSIS — Z34.83 PRENATAL CARE, SUBSEQUENT PREGNANCY, THIRD TRIMESTER: ICD-10-CM

## 2018-04-10 DIAGNOSIS — Z98.891 H/O CESAREAN SECTION: ICD-10-CM

## 2018-04-10 DIAGNOSIS — Z3A.31 31 WEEKS GESTATION OF PREGNANCY: Primary | ICD-10-CM

## 2018-04-10 PROCEDURE — 99212 OFFICE O/P EST SF 10 MIN: CPT | Performed by: OBSTETRICS & GYNECOLOGY

## 2018-04-10 NOTE — PATIENT INSTRUCTIONS
FETAL MOVEMENT CHART    Begin counting the baby's movements when you awake in the morning, or at approximately 9:00 a.m. Count ten separate times that the baby moves. A movement can be either a kick, a swish, a turn or a flip of the baby inside.     Rafael Goods

## 2018-04-10 NOTE — PROGRESS NOTES
c/o lightheadedness, feeling of passing out, pelvic/hip pain  Would like to decrease her hours.   She works 3 12 hour days  Will write excuse for half time    Good fm  rtc 2 wk

## 2018-04-18 ENCOUNTER — HOSPITAL ENCOUNTER (OUTPATIENT)
Facility: HOSPITAL | Age: 28
Setting detail: OBSERVATION
Discharge: HOME | End: 2018-04-18
Attending: OBSTETRICS & GYNECOLOGY | Admitting: OBSTETRICS & GYNECOLOGY
Payer: COMMERCIAL

## 2018-04-18 VITALS
TEMPERATURE: 99 F | HEART RATE: 83 BPM | HEIGHT: 66 IN | BODY MASS INDEX: 25.55 KG/M2 | DIASTOLIC BLOOD PRESSURE: 82 MMHG | WEIGHT: 159 LBS | RESPIRATION RATE: 18 BRPM | SYSTOLIC BLOOD PRESSURE: 117 MMHG

## 2018-04-18 PROCEDURE — 59025 FETAL NON-STRESS TEST: CPT | Performed by: OBSTETRICS & GYNECOLOGY

## 2018-04-19 NOTE — NST
Nonstress Test   Patient: Beatriz Harley    Gestation: 33w0d    NST:       Variability: Moderate           Accelerations: Yes           Decelerations: None            Baseline: 145 BPM           Uterine Irritability: Yes           Contractions: Not pr

## 2018-04-19 NOTE — PROGRESS NOTES
Pt presents as  w/ edc of 18 c/o ctx for the last several days with increased pelvic pressure today. Pt ambulatory and admitted to triage room 4 accompanied by . EFM tested and applied, FHTs tracing and audible in 130s.  Pt states ctx have be

## 2018-04-19 NOTE — PROGRESS NOTES
Discharge instructions given and explained, pt verbalizes understanding and agrees. Third trimester HIV screening declination signed. Pt ambulatory and discharged home.

## 2018-04-24 ENCOUNTER — LAB ENCOUNTER (OUTPATIENT)
Dept: LAB | Age: 28
End: 2018-04-24
Attending: OBSTETRICS & GYNECOLOGY

## 2018-04-24 ENCOUNTER — OFFICE VISIT (OUTPATIENT)
Dept: OBGYN CLINIC | Facility: CLINIC | Age: 28
End: 2018-04-24

## 2018-04-24 VITALS
WEIGHT: 164 LBS | DIASTOLIC BLOOD PRESSURE: 82 MMHG | SYSTOLIC BLOOD PRESSURE: 104 MMHG | BODY MASS INDEX: 26.36 KG/M2 | HEIGHT: 66 IN

## 2018-04-24 DIAGNOSIS — Z36.9 ENCOUNTER FOR ANTENATAL SCREENING OF MOTHER: ICD-10-CM

## 2018-04-24 DIAGNOSIS — Z34.83 PRENATAL CARE, SUBSEQUENT PREGNANCY, THIRD TRIMESTER: ICD-10-CM

## 2018-04-24 DIAGNOSIS — Z98.891 H/O CESAREAN SECTION: Primary | ICD-10-CM

## 2018-04-24 PROCEDURE — 99213 OFFICE O/P EST LOW 20 MIN: CPT | Performed by: OBSTETRICS & GYNECOLOGY

## 2018-04-24 PROCEDURE — 36415 COLL VENOUS BLD VENIPUNCTURE: CPT | Performed by: OBSTETRICS & GYNECOLOGY

## 2018-04-24 PROCEDURE — 87389 HIV-1 AG W/HIV-1&-2 AB AG IA: CPT | Performed by: OBSTETRICS & GYNECOLOGY

## 2018-04-24 NOTE — PROGRESS NOTES
No C/O, good FM  HIV to go lab  [facility] discussed  Contractions discussed  SVE and GBS next visit  2 mahesh

## 2018-05-01 ENCOUNTER — TELEPHONE (OUTPATIENT)
Dept: OBGYN CLINIC | Facility: CLINIC | Age: 28
End: 2018-05-01

## 2018-05-01 NOTE — TELEPHONE ENCOUNTER
G3/P 2 GA 34 6/7 wks patient complaining of bad headache yesterday and heartburn early this AM that woke her. States that she threw up a large amount after waking with the heartburn.  Took BP this morning because she wasn't feeling well; it was 114/74. 125/

## 2018-05-01 NOTE — TELEPHONE ENCOUNTER
Contacted patient. Instructed on Tylenol and acid reducers. Encouraged fluid intake and instructed to call with any worsening symptoms, persistent symptoms, or new concerns. Keep next appt otherwise (5/8/18).  Patient states understanding and agrees with pl

## 2018-05-04 ENCOUNTER — OFFICE VISIT (OUTPATIENT)
Dept: OBGYN CLINIC | Facility: CLINIC | Age: 28
End: 2018-05-04

## 2018-05-04 ENCOUNTER — APPOINTMENT (OUTPATIENT)
Dept: OBGYN CLINIC | Facility: CLINIC | Age: 28
End: 2018-05-04

## 2018-05-04 VITALS
WEIGHT: 163 LBS | SYSTOLIC BLOOD PRESSURE: 122 MMHG | BODY MASS INDEX: 26.2 KG/M2 | HEIGHT: 66 IN | DIASTOLIC BLOOD PRESSURE: 82 MMHG

## 2018-05-04 DIAGNOSIS — O36.8190 DECREASED FETAL MOVEMENT AFFECTING MANAGEMENT OF PREGNANCY, ANTEPARTUM, SINGLE OR UNSPECIFIED FETUS: Primary | ICD-10-CM

## 2018-05-04 DIAGNOSIS — O47.9 BRAXTON HICK'S CONTRACTION: ICD-10-CM

## 2018-05-04 DIAGNOSIS — O26.893 HEADACHE IN PREGNANCY, ANTEPARTUM, THIRD TRIMESTER: ICD-10-CM

## 2018-05-04 DIAGNOSIS — R51.9 HEADACHE IN PREGNANCY, ANTEPARTUM, THIRD TRIMESTER: ICD-10-CM

## 2018-05-04 PROCEDURE — 59025 FETAL NON-STRESS TEST: CPT | Performed by: NURSE PRACTITIONER

## 2018-05-04 NOTE — TELEPHONE ENCOUNTER
Patient has been drinking a lot of water and has been keeping her feet elevated but is still having issues. Having headaches and nausea. Contractions have been less often but are much stronger (take her breath away when she has one).   Last blood pressure

## 2018-05-04 NOTE — TELEPHONE ENCOUNTER
; 35W2D   Pt reports increasing headaches over the last few days; hands and feet swolling; feeling lightheaded. Pt reports feeling \"off\". Pt has been pushing fluids and taking Tylenol PRN with no improvement.   Pt reports fetal movement present, but

## 2018-05-04 NOTE — PROGRESS NOTES
Patient is here for evaluation of blood pressure, she states it has never been this high in her life. She has been running 120-130/ 80's at home. She has a headache bilaterally, top to the sides of her head. She has taken tylenol and it persists.   She ha

## 2018-05-08 ENCOUNTER — OFFICE VISIT (OUTPATIENT)
Dept: OBGYN CLINIC | Facility: CLINIC | Age: 28
End: 2018-05-08

## 2018-05-08 VITALS
BODY MASS INDEX: 27.19 KG/M2 | HEIGHT: 66 IN | DIASTOLIC BLOOD PRESSURE: 82 MMHG | WEIGHT: 169.19 LBS | SYSTOLIC BLOOD PRESSURE: 120 MMHG

## 2018-05-08 DIAGNOSIS — J45.909 ASTHMA, UNSPECIFIED ASTHMA SEVERITY, UNSPECIFIED WHETHER COMPLICATED, UNSPECIFIED WHETHER PERSISTENT: ICD-10-CM

## 2018-05-08 DIAGNOSIS — Z34.83 ENCOUNTER FOR SUPERVISION OF OTHER NORMAL PREGNANCY IN THIRD TRIMESTER: Primary | ICD-10-CM

## 2018-05-08 DIAGNOSIS — Z98.891 H/O CESAREAN SECTION: ICD-10-CM

## 2018-05-08 PROCEDURE — 87081 CULTURE SCREEN ONLY: CPT | Performed by: OBSTETRICS & GYNECOLOGY

## 2018-05-08 PROCEDURE — 87653 STREP B DNA AMP PROBE: CPT | Performed by: OBSTETRICS & GYNECOLOGY

## 2018-05-08 NOTE — PROGRESS NOTES
AMY  Doing well, +FM  Denies VB. Mode of delivery: [facility] 05/31 anticipated  SVE 0/50/-3   GBS collected   Patient reports HA at home, nightly, advised Tylenol PO, increased water, rest and Benadryl. Patient self monitoring BP.  Advised to monitor BP at resti

## 2018-05-10 ENCOUNTER — OFFICE VISIT (OUTPATIENT)
Dept: OBGYN CLINIC | Facility: CLINIC | Age: 28
End: 2018-05-10

## 2018-05-10 ENCOUNTER — APPOINTMENT (OUTPATIENT)
Dept: OBGYN CLINIC | Facility: CLINIC | Age: 28
End: 2018-05-10

## 2018-05-10 ENCOUNTER — TELEPHONE (OUTPATIENT)
Dept: OBGYN CLINIC | Facility: CLINIC | Age: 28
End: 2018-05-10

## 2018-05-10 VITALS
WEIGHT: 169 LBS | BODY MASS INDEX: 27.16 KG/M2 | DIASTOLIC BLOOD PRESSURE: 76 MMHG | SYSTOLIC BLOOD PRESSURE: 122 MMHG | HEIGHT: 66 IN

## 2018-05-10 DIAGNOSIS — Z34.83 ENCOUNTER FOR SUPERVISION OF OTHER NORMAL PREGNANCY IN THIRD TRIMESTER: Primary | ICD-10-CM

## 2018-05-10 DIAGNOSIS — O47.9 BRAXTON HICK'S CONTRACTION: ICD-10-CM

## 2018-05-10 PROCEDURE — 59025 FETAL NON-STRESS TEST: CPT | Performed by: NURSE PRACTITIONER

## 2018-05-10 NOTE — TELEPHONE ENCOUNTER
36W1D called c/o irregular contractions. She states that she has had sindi rice but now it is changing to where she is stopped in her tracks and has to rest. Denies any other s/s.   Last OV:   Pregnancy Complications: NA  Abdominal pain: No

## 2018-05-10 NOTE — PROGRESS NOTES
Patient here due to painful contractions since last night. She is very uncomfortable, they are every 5 minutes then will stop for 20 minutes every few hours then resume.   She denies ROM or bleeding, notes good FM  NST Reactive, possible ctx every 10 bernardo

## 2018-05-10 NOTE — TELEPHONE ENCOUNTER
Pt was in to see Dr Tana Escobar on 5/8   Pt calling today because she is getting \"take your breath away\" contraction pains   With pressure, sweats, nausea, and back pain  Pt said she was told when she was here last that she was not dilated at all  Pt said the

## 2018-05-15 ENCOUNTER — APPOINTMENT (OUTPATIENT)
Dept: OBGYN CLINIC | Facility: CLINIC | Age: 28
End: 2018-05-15

## 2018-05-15 ENCOUNTER — OFFICE VISIT (OUTPATIENT)
Dept: OBGYN CLINIC | Facility: CLINIC | Age: 28
End: 2018-05-15

## 2018-05-15 VITALS
BODY MASS INDEX: 27.48 KG/M2 | SYSTOLIC BLOOD PRESSURE: 110 MMHG | DIASTOLIC BLOOD PRESSURE: 84 MMHG | WEIGHT: 171 LBS | HEIGHT: 66 IN

## 2018-05-15 DIAGNOSIS — O36.8190 DECREASED FETAL MOVEMENT AFFECTING MANAGEMENT OF PREGNANCY, ANTEPARTUM, SINGLE OR UNSPECIFIED FETUS: ICD-10-CM

## 2018-05-15 DIAGNOSIS — Z98.891 H/O CESAREAN SECTION: ICD-10-CM

## 2018-05-15 DIAGNOSIS — Z3A.36 36 WEEKS GESTATION OF PREGNANCY: Primary | ICD-10-CM

## 2018-05-15 PROCEDURE — 59025 FETAL NON-STRESS TEST: CPT | Performed by: OBSTETRICS & GYNECOLOGY

## 2018-05-15 NOTE — PROGRESS NOTES
c/o decreased fetal movement for the past 2 days, encouraged pt to let us know right away when notes decreased movement  No c/o contractions or lof, or bleeding  Will get nst today  nst reactive Irregular contractions  Labor instructions  And fetal movemen

## 2018-05-15 NOTE — PATIENT INSTRUCTIONS
FETAL MOVEMENT CHART    Begin counting the baby's movements when you awake in the morning, or at approximately 9:00 a.m. Count ten separate times that the baby moves. A movement can be either a kick, a swish, a turn or a flip of the baby inside.     Bradley Kaufman

## 2018-05-16 ENCOUNTER — HOSPITAL ENCOUNTER (INPATIENT)
Facility: HOSPITAL | Age: 28
LOS: 3 days | Discharge: HOME | End: 2018-05-19
Attending: OBSTETRICS & GYNECOLOGY | Admitting: OBSTETRICS & GYNECOLOGY
Payer: COMMERCIAL

## 2018-05-16 ENCOUNTER — SURGERY (OUTPATIENT)
Age: 28
End: 2018-05-16

## 2018-05-16 PROBLEM — Z34.90 PREGNANCY: Status: ACTIVE | Noted: 2018-05-16

## 2018-05-16 PROBLEM — Z34.90 PREGNANCY (HCC): Status: ACTIVE | Noted: 2018-05-16

## 2018-05-16 PROCEDURE — 59510 CESAREAN DELIVERY: CPT | Performed by: OBSTETRICS & GYNECOLOGY

## 2018-05-16 RX ORDER — ONDANSETRON 2 MG/ML
INJECTION INTRAMUSCULAR; INTRAVENOUS
Status: DISPENSED
Start: 2018-05-16 | End: 2018-05-17

## 2018-05-16 RX ORDER — TRISODIUM CITRATE DIHYDRATE AND CITRIC ACID MONOHYDRATE 500; 334 MG/5ML; MG/5ML
30 SOLUTION ORAL ONCE
Status: COMPLETED | OUTPATIENT
Start: 2018-05-16 | End: 2018-05-16

## 2018-05-16 RX ORDER — DIPHENHYDRAMINE HYDROCHLORIDE 50 MG/ML
12.5 INJECTION INTRAMUSCULAR; INTRAVENOUS EVERY 4 HOURS PRN
Status: DISCONTINUED | OUTPATIENT
Start: 2018-05-16 | End: 2018-05-19

## 2018-05-16 RX ORDER — MISOPROSTOL 200 UG/1
TABLET ORAL
Status: COMPLETED
Start: 2018-05-16 | End: 2018-05-16

## 2018-05-16 RX ORDER — CEFAZOLIN SODIUM/WATER 2 G/20 ML
2 SYRINGE (ML) INTRAVENOUS
Status: DISCONTINUED | OUTPATIENT
Start: 2018-05-16 | End: 2018-05-16

## 2018-05-16 RX ORDER — MORPHINE SULFATE 0.5 MG/ML
0.2 INJECTION, SOLUTION EPIDURAL; INTRATHECAL; INTRAVENOUS ONCE
Status: DISCONTINUED | OUTPATIENT
Start: 2018-05-16 | End: 2018-05-16

## 2018-05-16 RX ORDER — IBUPROFEN 600 MG/1
600 TABLET ORAL EVERY 6 HOURS SCHEDULED
Status: DISCONTINUED | OUTPATIENT
Start: 2018-05-17 | End: 2018-05-19

## 2018-05-16 RX ORDER — HYDROMORPHONE HYDROCHLORIDE 1 MG/ML
0.4 INJECTION, SOLUTION INTRAMUSCULAR; INTRAVENOUS; SUBCUTANEOUS EVERY 5 MIN PRN
Status: DISCONTINUED | OUTPATIENT
Start: 2018-05-16 | End: 2018-05-16 | Stop reason: HOSPADM

## 2018-05-16 RX ORDER — ONDANSETRON 2 MG/ML
4 INJECTION INTRAMUSCULAR; INTRAVENOUS EVERY 6 HOURS PRN
Status: DISCONTINUED | OUTPATIENT
Start: 2018-05-16 | End: 2018-05-19

## 2018-05-16 RX ORDER — SIMETHICONE 80 MG
80 TABLET,CHEWABLE ORAL 3 TIMES DAILY PRN
Status: DISCONTINUED | OUTPATIENT
Start: 2018-05-16 | End: 2018-05-19

## 2018-05-16 RX ORDER — MISOPROSTOL 200 UG/1
1000 TABLET ORAL ONCE
Status: COMPLETED | OUTPATIENT
Start: 2018-05-16 | End: 2018-05-16

## 2018-05-16 RX ORDER — HYDROMORPHONE HYDROCHLORIDE 1 MG/ML
0.4 INJECTION, SOLUTION INTRAMUSCULAR; INTRAVENOUS; SUBCUTANEOUS EVERY 2 HOUR PRN
Status: DISPENSED | OUTPATIENT
Start: 2018-05-16 | End: 2018-05-17

## 2018-05-16 RX ORDER — SODIUM CHLORIDE, SODIUM LACTATE, POTASSIUM CHLORIDE, CALCIUM CHLORIDE 600; 310; 30; 20 MG/100ML; MG/100ML; MG/100ML; MG/100ML
INJECTION, SOLUTION INTRAVENOUS CONTINUOUS
Status: DISCONTINUED | OUTPATIENT
Start: 2018-05-16 | End: 2018-05-16

## 2018-05-16 RX ORDER — DOCUSATE SODIUM 100 MG/1
100 CAPSULE, LIQUID FILLED ORAL
Status: DISCONTINUED | OUTPATIENT
Start: 2018-05-17 | End: 2018-05-19

## 2018-05-16 RX ORDER — KETOROLAC TROMETHAMINE 30 MG/ML
30 INJECTION, SOLUTION INTRAMUSCULAR; INTRAVENOUS EVERY 6 HOURS PRN
Status: DISPENSED | OUTPATIENT
Start: 2018-05-16 | End: 2018-05-17

## 2018-05-16 RX ORDER — HYDROCODONE BITARTRATE AND ACETAMINOPHEN 10; 325 MG/1; MG/1
1 TABLET ORAL EVERY 4 HOURS PRN
Status: DISCONTINUED | OUTPATIENT
Start: 2018-05-16 | End: 2018-05-19

## 2018-05-16 RX ORDER — NALBUPHINE HCL 10 MG/ML
2.5 AMPUL (ML) INJECTION
Status: DISCONTINUED | OUTPATIENT
Start: 2018-05-16 | End: 2018-05-16

## 2018-05-16 RX ORDER — KETOROLAC TROMETHAMINE 30 MG/ML
30 INJECTION, SOLUTION INTRAMUSCULAR; INTRAVENOUS ONCE AS NEEDED
Status: COMPLETED | OUTPATIENT
Start: 2018-05-16 | End: 2018-05-16

## 2018-05-16 RX ORDER — NALBUPHINE HCL 10 MG/ML
2.5 AMPUL (ML) INJECTION EVERY 4 HOURS PRN
Status: DISCONTINUED | OUTPATIENT
Start: 2018-05-16 | End: 2018-05-19

## 2018-05-16 RX ORDER — DIPHENHYDRAMINE HCL 25 MG
25 CAPSULE ORAL EVERY 4 HOURS PRN
Status: DISCONTINUED | OUTPATIENT
Start: 2018-05-16 | End: 2018-05-19

## 2018-05-16 RX ORDER — ZOLPIDEM TARTRATE 5 MG/1
5 TABLET ORAL NIGHTLY PRN
Status: DISCONTINUED | OUTPATIENT
Start: 2018-05-16 | End: 2018-05-19

## 2018-05-16 RX ORDER — DIPHENHYDRAMINE HYDROCHLORIDE 50 MG/ML
25 INJECTION INTRAMUSCULAR; INTRAVENOUS ONCE AS NEEDED
Status: DISCONTINUED | OUTPATIENT
Start: 2018-05-16 | End: 2018-05-16

## 2018-05-16 RX ORDER — ONDANSETRON 2 MG/ML
4 INJECTION INTRAMUSCULAR; INTRAVENOUS EVERY 6 HOURS PRN
Status: DISCONTINUED | OUTPATIENT
Start: 2018-05-16 | End: 2018-05-16

## 2018-05-16 RX ORDER — HYDROCODONE BITARTRATE AND ACETAMINOPHEN 5; 325 MG/1; MG/1
1 TABLET ORAL EVERY 4 HOURS PRN
Status: DISCONTINUED | OUTPATIENT
Start: 2018-05-16 | End: 2018-05-19

## 2018-05-16 RX ORDER — NALOXONE HYDROCHLORIDE 0.4 MG/ML
0.08 INJECTION, SOLUTION INTRAMUSCULAR; INTRAVENOUS; SUBCUTANEOUS
Status: ACTIVE | OUTPATIENT
Start: 2018-05-16 | End: 2018-05-17

## 2018-05-16 RX ORDER — DEXTROSE, SODIUM CHLORIDE, SODIUM LACTATE, POTASSIUM CHLORIDE, AND CALCIUM CHLORIDE 5; .6; .31; .03; .02 G/100ML; G/100ML; G/100ML; G/100ML; G/100ML
INJECTION, SOLUTION INTRAVENOUS CONTINUOUS
Status: DISCONTINUED | OUTPATIENT
Start: 2018-05-17 | End: 2018-05-19

## 2018-05-16 RX ORDER — ONDANSETRON 2 MG/ML
4 INJECTION INTRAMUSCULAR; INTRAVENOUS ONCE AS NEEDED
Status: DISCONTINUED | OUTPATIENT
Start: 2018-05-16 | End: 2018-05-16

## 2018-05-16 RX ORDER — BISACODYL 10 MG
10 SUPPOSITORY, RECTAL RECTAL
Status: DISCONTINUED | OUTPATIENT
Start: 2018-05-16 | End: 2018-05-19

## 2018-05-16 NOTE — PROGRESS NOTES
Pt is a 24year old female admitted to TRG5/TRG5-A. Pt is  37w0d intra-uterine pregnancy. Patient presents with:  R/o Labor: Pt states intense UCs began today around 1230, now about every 5 mins     History obtained, consents signed.  Oriented to

## 2018-05-16 NOTE — OPERATIVE REPORT
[facility]   Section - Operative Note    Lew Cordoba Patient Status:  Inpatient    1990 MRN [id number]   Location [address] [facility] Attending Edd Swanson MD   Hosp Day # 0 PCP MD Carrol Patel curettage and manual removal was done. There was mild bleeding from the placental bed. 2 interrupted sutures were placed of 0 chromic. Royann Adi tubes and ovaries were normal in appearance. The uterus was  closed using 0 chromic.  .   The incision was inspected

## 2018-05-16 NOTE — H&P
Dwain Harley Patient Status:  Inpatient    1990 MRN [id number]   Location [facility] Attending Gerhard Sanchez MD   Hosp Day # 0 PCP Violeta Martinez MD     Subjective:  Patrizia Moses section    Assessment/Plan:    IUP at 37 weeks  Previous  section x 2         Risks, benefits, alternatives and possible complications have been discussed in detail with the patient.   Pre-admission, admission, and post admission procedures and expe

## 2018-05-16 NOTE — BRIEF OP NOTE
Pre-Operative Diagnosis: IUP labor at 37 weeks, previous  x2, labor     Post-Operative Diagnosis: same , with some uterine atony     Procedure Performed: repeat low transverse  section with delivery of a female infant 5# 12oz, 11,7  Procedu

## 2018-05-16 NOTE — PLAN OF CARE
Problem: Patient/Family Goals  Goal: Patient/Family Long Term Goal  Patient's Long Term Goal: uncomplicated  delivery  Interventions:  - See additional Care Plan goals for specific interventions   Outcome: Progressing    Goal: Patient/Family Short

## 2018-05-17 NOTE — PROGRESS NOTES
Nauseous  overnight, had zofran x1, feeling better, tolerating clears. Diet advanced to full liquid breakfast.  Up for pericare and tolerated well. Using breast pump. To NICU via wheelchair.   Baby stable, off O2, has NG tube but might d/c from NICU today

## 2018-05-17 NOTE — PLAN OF CARE
Problem: SAFETY ADULT - FALL  Goal: Free from fall injury  INTERVENTIONS:  - Assess pt frequently for physical needs  - Identify cognitive and physical deficits and behaviors that affect risk of falls.   - [place] fall precautions as indicated by assessme

## 2018-05-17 NOTE — PLAN OF CARE
NURSING ADMISSION NOTE  Patient admitted via cart  Oriented to room. Safety precautions initiated. Bed in low position. Call light in reach.     POSTPARTUM    • Long Term Goal:Experiences normal postpartum course Progressing    • Optimize infant feeding

## 2018-05-17 NOTE — PROGRESS NOTES
Pt transferred  to Mother Baby room [phone] in stable condition. Report given to Kamla Rucker RN. Infant transferred with mother in stable condition.

## 2018-05-18 NOTE — PROGRESS NOTES
[facility]    Patients Name: Lora Vegas  Attending Physician: Flakito Hummel MD  CSN: [id number]    Location:  5529/5030-Z  MRN: [id number]    YOB: 1990  Admission Date: 5/16/2018     Obstetric Anesthesia Pain Progress Note

## 2018-05-18 NOTE — PLAN OF CARE
POSTPARTUM    • Long Term Goal:Experiences normal postpartum course Progressing        SAFETY ADULT - FALL    • Free from fall injury Progressing

## 2018-05-18 NOTE — PROGRESS NOTES
POD#2  No C/O  Afebrile, VS stable  Hg: 10.1  Abdomen soft, wound clean  Infant may be out of NICU today  Home tomorrow

## 2018-05-19 VITALS
TEMPERATURE: 98 F | OXYGEN SATURATION: 97 % | HEART RATE: 71 BPM | HEIGHT: 66 IN | DIASTOLIC BLOOD PRESSURE: 80 MMHG | RESPIRATION RATE: 16 BRPM | BODY MASS INDEX: 27.48 KG/M2 | SYSTOLIC BLOOD PRESSURE: 111 MMHG | WEIGHT: 171 LBS

## 2018-05-19 PROBLEM — Z34.90 PREGNANCY: Status: RESOLVED | Noted: 2018-05-16 | Resolved: 2018-05-19

## 2018-05-19 PROBLEM — Z34.90 PREGNANCY (HCC): Status: RESOLVED | Noted: 2018-05-16 | Resolved: 2018-05-19

## 2018-05-19 RX ORDER — HYDROCODONE BITARTRATE AND ACETAMINOPHEN 10; 325 MG/1; MG/1
1 TABLET ORAL EVERY 4 HOURS PRN
Qty: 25 TABLET | Refills: 0 | Status: SHIPPED | OUTPATIENT
Start: 2018-05-19 | End: 2018-05-30

## 2018-05-19 NOTE — PLAN OF CARE
Problem: SAFETY ADULT - FALL  Goal: Free from fall injury  INTERVENTIONS:  - Assess pt frequently for physical needs  - Identify cognitive and physical deficits and behaviors that affect risk of falls.   - [place] fall precautions as indicated by assessme with breast feeding.  - Provide information as needed about early infant feeding cues (e.g., rooting, lip smacking, sucking fingers/hand) versus late cue of crying.  - Discuss/demonstrate breast feeding aids (e.g., infant sling, nursing footstool/pillows,

## 2018-05-19 NOTE — PROGRESS NOTES
[facility]  Post-Partum Caesarean Section Progress Note    Jerryl Severin Patient Status:  Inpatient    1990 MRN [id number]   [facility] 2SW-J Attending Merary Estimable

## 2018-05-19 NOTE — DISCHARGE SUMMARY
[facility]  Discharge Summary    Marcel Castrejon Patient Status:  Inpatient    1990 MRN [id number]   [facility] 2SW-J Attending Michael Joseph MD   Hosp Day # 3 PCP Vivian Bagley MD         [facility]: Estimated Date of Delive

## 2018-05-19 NOTE — PROGRESS NOTES
Discharge patient to NICU to see her baby. Discussed in detail her d/c instructions and care. Questions and concerns regarding discharge and home care completed, patient states understanding. Went down to NICU with . Will leave from there.

## 2018-05-22 ENCOUNTER — TELEPHONE (OUTPATIENT)
Dept: OBGYN UNIT | Facility: HOSPITAL | Age: 28
End: 2018-05-22

## 2018-05-26 ENCOUNTER — HOSPITAL ENCOUNTER (OUTPATIENT)
Age: 28
Discharge: HOME | End: 2018-05-26
Payer: COMMERCIAL

## 2018-05-26 VITALS
SYSTOLIC BLOOD PRESSURE: 111 MMHG | OXYGEN SATURATION: 98 % | TEMPERATURE: 98 F | HEART RATE: 80 BPM | RESPIRATION RATE: 16 BRPM | DIASTOLIC BLOOD PRESSURE: 82 MMHG

## 2018-05-26 DIAGNOSIS — L50.0 ALLERGIC URTICARIA: Primary | ICD-10-CM

## 2018-05-26 DIAGNOSIS — T78.40XA ALLERGIC REACTION, INITIAL ENCOUNTER: ICD-10-CM

## 2018-05-26 PROCEDURE — 96372 THER/PROPH/DIAG INJ SC/IM: CPT

## 2018-05-26 PROCEDURE — 99214 OFFICE O/P EST MOD 30 MIN: CPT

## 2018-05-26 RX ORDER — PREDNISONE 20 MG/1
TABLET ORAL
Qty: 18 TABLET | Refills: 0 | Status: SHIPPED | OUTPATIENT
Start: 2018-05-26 | End: 2018-07-13

## 2018-05-26 RX ORDER — METHYLPREDNISOLONE SODIUM SUCCINATE 125 MG/2ML
125 INJECTION, POWDER, LYOPHILIZED, FOR SOLUTION INTRAMUSCULAR; INTRAVENOUS ONCE
Status: COMPLETED | OUTPATIENT
Start: 2018-05-26 | End: 2018-05-26

## 2018-05-26 NOTE — ED INITIAL ASSESSMENT (HPI)
Pt is post    Was taking norco for pain. Last dose Thursday. Developed hives last night and today. Took generic Claritin 9am today. No respiratory compromise or symptoms.

## 2018-05-26 NOTE — ED PROVIDER NOTES
Patient Seen in: [facility]    History   Patient presents with:  Hives    Stated Complaint: Rash    HPI  Patient is a 53-year-old female that presents with 1 day history of itchy hives. Patient is 10 days postpartum.   Patient h Constitutional: She is oriented to person, place, and time. She appears well-developed and well-nourished. No distress.    HENT:   Mouth/Throat: Oropharynx is clear and moist.   Eyes: Conjunctivae are normal.   Pulmonary/Chest: Effort normal.   Neurological

## 2018-05-30 ENCOUNTER — OFFICE VISIT (OUTPATIENT)
Dept: OBGYN CLINIC | Facility: CLINIC | Age: 28
End: 2018-05-30

## 2018-05-30 VITALS
SYSTOLIC BLOOD PRESSURE: 110 MMHG | WEIGHT: 149 LBS | DIASTOLIC BLOOD PRESSURE: 70 MMHG | BODY MASS INDEX: 23.95 KG/M2 | HEIGHT: 66 IN

## 2018-05-30 RX ORDER — LORATADINE 10 MG/1
10 TABLET ORAL DAILY
COMMUNITY
End: 2018-07-13

## 2018-06-25 ENCOUNTER — POSTPARTUM (OUTPATIENT)
Dept: OBGYN CLINIC | Facility: CLINIC | Age: 28
End: 2018-06-25

## 2018-06-25 VITALS — DIASTOLIC BLOOD PRESSURE: 76 MMHG | WEIGHT: 144 LBS | BODY MASS INDEX: 23 KG/M2 | SYSTOLIC BLOOD PRESSURE: 122 MMHG

## 2018-06-25 NOTE — PROGRESS NOTES
GYNE postpartum note     S: patient is a 24yo wf who presents today for post partum visit. She underwent repeat ltcs on 05/16/18. She is doing well, still breast feeding . Has no complaints.  Bleeding is minimal now   Denies any depressed mood, anxiety, n

## 2018-07-13 ENCOUNTER — OFFICE VISIT (OUTPATIENT)
Dept: OBGYN CLINIC | Facility: CLINIC | Age: 28
End: 2018-07-13

## 2018-07-13 VITALS
BODY MASS INDEX: 22.88 KG/M2 | SYSTOLIC BLOOD PRESSURE: 102 MMHG | HEIGHT: 66 IN | DIASTOLIC BLOOD PRESSURE: 80 MMHG | WEIGHT: 142.38 LBS

## 2018-07-13 DIAGNOSIS — Z30.430 ENCOUNTER FOR INSERTION OF INTRAUTERINE CONTRACEPTIVE DEVICE: ICD-10-CM

## 2018-07-13 DIAGNOSIS — Z30.430 ENCOUNTER FOR IUD INSERTION: Primary | ICD-10-CM

## 2018-07-13 PROBLEM — O47.9 BRAXTON HICK'S CONTRACTION (HCC): Status: RESOLVED | Noted: 2018-05-04 | Resolved: 2018-07-13

## 2018-07-13 PROBLEM — O26.899 ABDOMINAL PAIN AFFECTING PREGNANCY: Status: RESOLVED | Noted: 2018-01-05 | Resolved: 2018-07-13

## 2018-07-13 PROBLEM — O47.9 BRAXTON HICK'S CONTRACTION: Status: RESOLVED | Noted: 2018-05-04 | Resolved: 2018-07-13

## 2018-07-13 PROBLEM — Z3A.36 36 WEEKS GESTATION OF PREGNANCY (HCC): Status: RESOLVED | Noted: 2018-05-15 | Resolved: 2018-07-13

## 2018-07-13 PROBLEM — R10.9 ABDOMINAL PAIN AFFECTING PREGNANCY (HCC): Status: RESOLVED | Noted: 2018-01-05 | Resolved: 2018-07-13

## 2018-07-13 PROBLEM — O26.899 ABDOMINAL PAIN AFFECTING PREGNANCY (HCC): Status: RESOLVED | Noted: 2018-01-05 | Resolved: 2018-07-13

## 2018-07-13 PROBLEM — Z3A.36 36 WEEKS GESTATION OF PREGNANCY: Status: RESOLVED | Noted: 2018-05-15 | Resolved: 2018-07-13

## 2018-07-13 PROBLEM — R10.9 ABDOMINAL PAIN AFFECTING PREGNANCY: Status: RESOLVED | Noted: 2018-01-05 | Resolved: 2018-07-13

## 2018-07-13 LAB — CONTROL LINE PRESENT WITH A CLEAR BACKGROUND (YES/NO): YES YES/NO

## 2018-07-13 PROCEDURE — 81025 URINE PREGNANCY TEST: CPT | Performed by: OBSTETRICS & GYNECOLOGY

## 2018-07-13 PROCEDURE — 58300 INSERT INTRAUTERINE DEVICE: CPT | Performed by: OBSTETRICS & GYNECOLOGY

## 2018-07-13 RX ORDER — COPPER 313.4 MG/1
1 INTRAUTERINE DEVICE INTRAUTERINE ONCE
Status: COMPLETED | OUTPATIENT
Start: 2018-07-13 | End: 2018-07-13

## 2018-07-13 RX ADMIN — COPPER 1 DEVICE: 313.4 INTRAUTERINE DEVICE INTRAUTERINE at 11:04:00

## 2018-07-13 NOTE — PROCEDURES
Procedure: Intrauterine device insertion - Paraguard     Date of Procedure: 07/13/18    Pre-procedure diagnosis:  Encounter for contraception IUD insert     Post-procedure diagnosis:   Encounter for contraction IUD insert     Indications:   24year old fem None    Follow up: 4 weeks     Josephine Bradshaw MD   [facility] - OBGYN

## 2018-09-02 ENCOUNTER — OFFICE VISIT (OUTPATIENT)
Dept: FAMILY MEDICINE CLINIC | Facility: CLINIC | Age: 28
End: 2018-09-02
Payer: COMMERCIAL

## 2018-09-02 VITALS
HEART RATE: 77 BPM | BODY MASS INDEX: 23 KG/M2 | RESPIRATION RATE: 15 BRPM | SYSTOLIC BLOOD PRESSURE: 116 MMHG | DIASTOLIC BLOOD PRESSURE: 72 MMHG | TEMPERATURE: 98 F | WEIGHT: 140 LBS

## 2018-09-02 DIAGNOSIS — J40 BRONCHITIS: Primary | ICD-10-CM

## 2018-09-02 PROCEDURE — 99213 OFFICE O/P EST LOW 20 MIN: CPT | Performed by: NURSE PRACTITIONER

## 2018-09-02 RX ORDER — BENZONATATE 200 MG/1
200 CAPSULE ORAL 3 TIMES DAILY PRN
Qty: 30 CAPSULE | Refills: 0 | Status: SHIPPED | OUTPATIENT
Start: 2018-09-02 | End: 2019-06-29 | Stop reason: ALTCHOICE

## 2018-09-02 RX ORDER — ALBUTEROL SULFATE 90 UG/1
2 AEROSOL, METERED RESPIRATORY (INHALATION) EVERY 6 HOURS PRN
Qty: 1 INHALER | Refills: 0 | Status: SHIPPED | OUTPATIENT
Start: 2018-09-02 | End: 2019-06-29 | Stop reason: ALTCHOICE

## 2018-09-02 NOTE — PROGRESS NOTES
CHIEF COMPLAINT:   Patient presents with:  Sinus Problem  Cough      HPI:   Desiree Sarah is a 24year old female who presents for upper respiratory symptoms for  5 days.  Patient reports congestion, low grade fever, dry cough, chest pain from coughin /72   Pulse 77   Temp 98.3 °F (36.8 °C) (Oral)   Resp 15   Wt 140 lb   BMI 22.60 kg/m²   GENERAL: well developed, well nourished,in no apparent distress  SKIN: no rashes,no suspicious lesions  HEAD: atraumatic, normocephalic.   Slight pressure on palp You have a viral bronchitis. Bronchitis is inflammation and swelling of the lining of the lungs. This is often caused by an infection. Symptoms include a dry, hacking cough that is worse at night. The cough may bring up yellow-green mucus.  You may also fee · Over-the-counter cough, cold, and sore-throat medicines will not shorten the length of the illness, but they may help to reduce symptoms. Don't use decongestants if you have high blood pressure.   Follow-up care  Follow up with your healthcare provider, o

## 2019-01-21 ENCOUNTER — TELEPHONE (OUTPATIENT)
Dept: OBGYN CLINIC | Facility: CLINIC | Age: 29
End: 2019-01-21

## 2019-01-22 NOTE — TELEPHONE ENCOUNTER
Patient was coming in for Annual and IUD removal and reinsertion; She was wondering if she still comes in Friday can she have her Annual and at least removal of the IUD. Please let her know.

## 2019-01-25 ENCOUNTER — OFFICE VISIT (OUTPATIENT)
Dept: OBGYN CLINIC | Facility: CLINIC | Age: 29
End: 2019-01-25
Payer: COMMERCIAL

## 2019-01-25 VITALS
WEIGHT: 134 LBS | BODY MASS INDEX: 21.53 KG/M2 | SYSTOLIC BLOOD PRESSURE: 124 MMHG | DIASTOLIC BLOOD PRESSURE: 74 MMHG | HEIGHT: 66 IN

## 2019-01-25 DIAGNOSIS — Z12.4 CERVICAL CANCER SCREENING: ICD-10-CM

## 2019-01-25 DIAGNOSIS — Z01.419 WELL WOMAN EXAM WITH ROUTINE GYNECOLOGICAL EXAM: Primary | ICD-10-CM

## 2019-01-25 DIAGNOSIS — Z30.432 ENCOUNTER FOR REMOVAL OF INTRAUTERINE CONTRACEPTIVE DEVICE: ICD-10-CM

## 2019-01-25 PROCEDURE — 88175 CYTOPATH C/V AUTO FLUID REDO: CPT | Performed by: NURSE PRACTITIONER

## 2019-01-25 PROCEDURE — 58301 REMOVE INTRAUTERINE DEVICE: CPT | Performed by: NURSE PRACTITIONER

## 2019-01-25 PROCEDURE — 99395 PREV VISIT EST AGE 18-39: CPT | Performed by: NURSE PRACTITIONER

## 2019-01-25 NOTE — PROGRESS NOTES
Here for Routine Annual Exam  No concerns or questions. Menses are extremely heavy and painful with the Paragard IUD in place. She would like the IUD removed. She had a mirena between her first 3 children and would like that again.   No C/O    ROS: No

## 2019-02-08 ENCOUNTER — OFFICE VISIT (OUTPATIENT)
Dept: OBGYN CLINIC | Facility: CLINIC | Age: 29
End: 2019-02-08
Payer: COMMERCIAL

## 2019-02-08 VITALS
DIASTOLIC BLOOD PRESSURE: 68 MMHG | SYSTOLIC BLOOD PRESSURE: 110 MMHG | WEIGHT: 134 LBS | HEIGHT: 66 IN | BODY MASS INDEX: 21.53 KG/M2

## 2019-02-08 DIAGNOSIS — Z30.430 ENCOUNTER FOR INSERTION OF INTRAUTERINE CONTRACEPTIVE DEVICE: Primary | ICD-10-CM

## 2019-02-08 DIAGNOSIS — Z01.812 PRE-PROCEDURAL LABORATORY EXAMINATION: ICD-10-CM

## 2019-02-08 LAB
CONTROL LINE PRESENT WITH A CLEAR BACKGROUND (YES/NO): YES YES/NO
KIT LOT #: NORMAL NUMERIC

## 2019-02-08 PROCEDURE — 81025 URINE PREGNANCY TEST: CPT | Performed by: NURSE PRACTITIONER

## 2019-02-08 PROCEDURE — 58300 INSERT INTRAUTERINE DEVICE: CPT | Performed by: NURSE PRACTITIONER

## 2019-06-29 ENCOUNTER — OFFICE VISIT (OUTPATIENT)
Dept: FAMILY MEDICINE CLINIC | Facility: CLINIC | Age: 29
End: 2019-06-29
Payer: COMMERCIAL

## 2019-06-29 VITALS
TEMPERATURE: 98 F | HEART RATE: 74 BPM | WEIGHT: 131 LBS | DIASTOLIC BLOOD PRESSURE: 60 MMHG | HEIGHT: 66 IN | SYSTOLIC BLOOD PRESSURE: 100 MMHG | RESPIRATION RATE: 16 BRPM | OXYGEN SATURATION: 99 % | BODY MASS INDEX: 21.05 KG/M2

## 2019-06-29 DIAGNOSIS — Z13.21 SCREENING FOR ENDOCRINE, NUTRITIONAL, METABOLIC AND IMMUNITY DISORDER: ICD-10-CM

## 2019-06-29 DIAGNOSIS — R53.82 CHRONIC FATIGUE: ICD-10-CM

## 2019-06-29 DIAGNOSIS — Z00.00 ROUTINE PHYSICAL EXAMINATION: Primary | ICD-10-CM

## 2019-06-29 DIAGNOSIS — Z13.228 SCREENING FOR ENDOCRINE, NUTRITIONAL, METABOLIC AND IMMUNITY DISORDER: ICD-10-CM

## 2019-06-29 DIAGNOSIS — Z83.49 FAMILY HISTORY OF THYROID DISEASE: ICD-10-CM

## 2019-06-29 DIAGNOSIS — Z13.29 SCREENING FOR ENDOCRINE, NUTRITIONAL, METABOLIC AND IMMUNITY DISORDER: ICD-10-CM

## 2019-06-29 DIAGNOSIS — R59.9 LYMPH NODE ENLARGEMENT: ICD-10-CM

## 2019-06-29 DIAGNOSIS — Z13.0 SCREENING FOR ENDOCRINE, NUTRITIONAL, METABOLIC AND IMMUNITY DISORDER: ICD-10-CM

## 2019-06-29 PROCEDURE — 99385 PREV VISIT NEW AGE 18-39: CPT | Performed by: FAMILY MEDICINE

## 2019-06-29 NOTE — PATIENT INSTRUCTIONS
Health Maintenance  Eat healthy well balanced diet - majority should be protein and vegetables  Get at least 150 min of exercise per week (30 min/5 days)  Wear sunscreen - SPF 30 or higher and reapply every 2 hours. Wear seat belts and drive safely.   Janeen

## 2019-06-29 NOTE — PROGRESS NOTES
Lucius Billy is a 34year old female.     CC:  Patient presents with:  Establish Care  Well Adult: annual visit, no pap  Fatigue      HPI:  Asthma Action Plan due on 09/26/1992  Asthma Control Test due on 09/26/1992  Annual Depression Screen due on facility-administered medications on file prior to visit.       History:  Past Medical History:   Diagnosis Date   • Amenorrhea     IUD   • Anemia     taking iron   • Asthma     exercise induced; last attack years ago; pt does not use inhaler   • Depression GENERAL HEALTH: no fevers/chills, no change in weight or appetite.   SKIN: denies any unusual skin lesions or rashes  EYES: no visual complaints or deficits  HEENT: denies nasal congestion, sinus pain or sore throat ,   RESPIRATORY: denies shortness of LIPID PANEL; Future  - TSH W REFLEX TO FREE T4; Future    3. Chronic fatigue  - CBC WITH DIFFERENTIAL WITH PLATELET; Future  - TSH W REFLEX TO FREE T4; Future  - FERRITIN; Future  - IRON AND TIBC;  Future  - VITAMIN B12 WITH REFLEX TO MMA; Future  - FOLIC A

## 2019-07-01 ENCOUNTER — LAB ENCOUNTER (OUTPATIENT)
Dept: LAB | Age: 29
End: 2019-07-01
Attending: FAMILY MEDICINE
Payer: COMMERCIAL

## 2019-07-01 DIAGNOSIS — Z13.228 SCREENING FOR ENDOCRINE, NUTRITIONAL, METABOLIC AND IMMUNITY DISORDER: ICD-10-CM

## 2019-07-01 DIAGNOSIS — R53.82 CHRONIC FATIGUE: ICD-10-CM

## 2019-07-01 DIAGNOSIS — Z13.0 SCREENING FOR ENDOCRINE, NUTRITIONAL, METABOLIC AND IMMUNITY DISORDER: ICD-10-CM

## 2019-07-01 DIAGNOSIS — Z83.49 FAMILY HISTORY OF THYROID DISEASE: ICD-10-CM

## 2019-07-01 DIAGNOSIS — Z00.00 ROUTINE PHYSICAL EXAMINATION: ICD-10-CM

## 2019-07-01 DIAGNOSIS — Z13.21 SCREENING FOR ENDOCRINE, NUTRITIONAL, METABOLIC AND IMMUNITY DISORDER: ICD-10-CM

## 2019-07-01 DIAGNOSIS — Z13.29 SCREENING FOR ENDOCRINE, NUTRITIONAL, METABOLIC AND IMMUNITY DISORDER: ICD-10-CM

## 2019-07-01 LAB
ALBUMIN SERPL-MCNC: 4.1 G/DL (ref 3.4–5)
ALBUMIN/GLOB SERPL: 1.5 {RATIO} (ref 1–2)
ALP LIVER SERPL-CCNC: 70 U/L (ref 37–98)
ALT SERPL-CCNC: 14 U/L (ref 13–56)
ANION GAP SERPL CALC-SCNC: 5 MMOL/L (ref 0–18)
AST SERPL-CCNC: 16 U/L (ref 15–37)
BASOPHILS # BLD AUTO: 0.02 X10(3) UL (ref 0–0.2)
BASOPHILS NFR BLD AUTO: 0.4 %
BILIRUB SERPL-MCNC: 1.2 MG/DL (ref 0.1–2)
BUN BLD-MCNC: 13 MG/DL (ref 7–18)
BUN/CREAT SERPL: 17.8 (ref 10–20)
CALCIUM BLD-MCNC: 8.4 MG/DL (ref 8.5–10.1)
CHLORIDE SERPL-SCNC: 110 MMOL/L (ref 98–112)
CHOLEST SMN-MCNC: 117 MG/DL (ref ?–200)
CO2 SERPL-SCNC: 28 MMOL/L (ref 21–32)
CREAT BLD-MCNC: 0.73 MG/DL (ref 0.55–1.02)
DEPRECATED HBV CORE AB SER IA-ACNC: 22.3 NG/ML (ref 12–114)
DEPRECATED RDW RBC AUTO: 38.2 FL (ref 35.1–46.3)
EOSINOPHIL # BLD AUTO: 0.18 X10(3) UL (ref 0–0.7)
EOSINOPHIL NFR BLD AUTO: 3.2 %
ERYTHROCYTE [DISTWIDTH] IN BLOOD BY AUTOMATED COUNT: 14.5 % (ref 11–15)
FOLATE SERPL-MCNC: 8.1 NG/ML (ref 8.7–?)
GLOBULIN PLAS-MCNC: 2.8 G/DL (ref 2.8–4.4)
GLUCOSE BLD-MCNC: 84 MG/DL (ref 70–99)
HCT VFR BLD AUTO: 39.9 % (ref 35–48)
HDLC SERPL-MCNC: 48 MG/DL (ref 40–59)
HGB BLD-MCNC: 12.6 G/DL (ref 12–16)
IMM GRANULOCYTES # BLD AUTO: 0.01 X10(3) UL (ref 0–1)
IMM GRANULOCYTES NFR BLD: 0.2 %
IRON SATURATION: 37 % (ref 15–50)
IRON SERPL-MCNC: 134 UG/DL (ref 50–170)
LDLC SERPL CALC-MCNC: 59 MG/DL (ref ?–100)
LYMPHOCYTES # BLD AUTO: 2.09 X10(3) UL (ref 1–4)
LYMPHOCYTES NFR BLD AUTO: 37.7 %
M PROTEIN MFR SERPL ELPH: 6.9 G/DL (ref 6.4–8.2)
MCH RBC QN AUTO: 23.7 PG (ref 26–34)
MCHC RBC AUTO-ENTMCNC: 31.6 G/DL (ref 31–37)
MCV RBC AUTO: 75 FL (ref 80–100)
MONOCYTES # BLD AUTO: 0.35 X10(3) UL (ref 0.1–1)
MONOCYTES NFR BLD AUTO: 6.3 %
NEUTROPHILS # BLD AUTO: 2.9 X10 (3) UL (ref 1.5–7.7)
NEUTROPHILS # BLD AUTO: 2.9 X10(3) UL (ref 1.5–7.7)
NEUTROPHILS NFR BLD AUTO: 52.2 %
NONHDLC SERPL-MCNC: 69 MG/DL (ref ?–130)
OSMOLALITY SERPL CALC.SUM OF ELEC: 295 MOSM/KG (ref 275–295)
PLATELET # BLD AUTO: 221 10(3)UL (ref 150–450)
POTASSIUM SERPL-SCNC: 3.9 MMOL/L (ref 3.5–5.1)
RBC # BLD AUTO: 5.32 X10(6)UL (ref 3.8–5.3)
SODIUM SERPL-SCNC: 143 MMOL/L (ref 136–145)
THYROGLOB SERPL-MCNC: 94 U/ML (ref ?–60)
THYROPEROXIDASE AB SERPL-ACNC: <28 U/ML (ref ?–60)
TOTAL IRON BINDING CAPACITY: 359 UG/DL (ref 240–450)
TRANSFERRIN SERPL-MCNC: 241 MG/DL (ref 200–360)
TRIGL SERPL-MCNC: 49 MG/DL (ref 30–149)
TSI SER-ACNC: 1.4 MIU/ML (ref 0.36–3.74)
VIT B12 SERPL-MCNC: 422 PG/ML (ref 193–986)
VLDLC SERPL CALC-MCNC: 10 MG/DL (ref 0–30)
WBC # BLD AUTO: 5.6 X10(3) UL (ref 4–11)

## 2019-07-01 PROCEDURE — 36415 COLL VENOUS BLD VENIPUNCTURE: CPT | Performed by: FAMILY MEDICINE

## 2019-07-01 PROCEDURE — 80050 GENERAL HEALTH PANEL: CPT | Performed by: FAMILY MEDICINE

## 2019-07-01 PROCEDURE — 82728 ASSAY OF FERRITIN: CPT | Performed by: FAMILY MEDICINE

## 2019-07-01 PROCEDURE — 83550 IRON BINDING TEST: CPT | Performed by: FAMILY MEDICINE

## 2019-07-01 PROCEDURE — 82746 ASSAY OF FOLIC ACID SERUM: CPT | Performed by: FAMILY MEDICINE

## 2019-07-01 PROCEDURE — 83540 ASSAY OF IRON: CPT | Performed by: FAMILY MEDICINE

## 2019-07-01 PROCEDURE — 80061 LIPID PANEL: CPT | Performed by: FAMILY MEDICINE

## 2019-07-01 PROCEDURE — 86800 THYROGLOBULIN ANTIBODY: CPT | Performed by: FAMILY MEDICINE

## 2019-07-01 PROCEDURE — 82607 VITAMIN B-12: CPT | Performed by: FAMILY MEDICINE

## 2019-07-01 PROCEDURE — 86376 MICROSOMAL ANTIBODY EACH: CPT | Performed by: FAMILY MEDICINE

## 2019-07-02 ENCOUNTER — TELEPHONE (OUTPATIENT)
Dept: FAMILY MEDICINE CLINIC | Facility: CLINIC | Age: 29
End: 2019-07-02

## 2019-07-02 DIAGNOSIS — R76.8 THYROID ANTIBODY POSITIVE: Primary | ICD-10-CM

## 2019-07-02 DIAGNOSIS — E53.8 FOLIC ACID DEFICIENCY: ICD-10-CM

## 2019-07-02 DIAGNOSIS — R79.89 ABNORMAL CBC: ICD-10-CM

## 2019-07-02 RX ORDER — MELATONIN
400 DAILY
Qty: 30 TABLET | Refills: 2 | Status: CANCELLED | OUTPATIENT
Start: 2019-07-02

## 2019-07-02 NOTE — TELEPHONE ENCOUNTER
LVM for pt to call back to review test results. PSR: OK to lync me when pt calls back. Thank you! Rx pending.

## 2019-07-02 NOTE — TELEPHONE ENCOUNTER
----- Message from Sissy Hernandez MD sent at 7/2/2019  9:09 AM CDT -----  Results reviewed. Tests show no significant abnormalities.    +ab to thyroid peroxidase, repeat tsh and t4 in 3 mo  Low folic acid level - start rx folic acid 2.7KZ po daily  Repeat cbc

## 2019-07-05 ENCOUNTER — HOSPITAL ENCOUNTER (OUTPATIENT)
Dept: ULTRASOUND IMAGING | Age: 29
Discharge: HOME | End: 2019-07-05
Attending: FAMILY MEDICINE
Payer: COMMERCIAL

## 2019-07-05 ENCOUNTER — TELEPHONE (OUTPATIENT)
Dept: FAMILY MEDICINE CLINIC | Facility: CLINIC | Age: 29
End: 2019-07-05

## 2019-07-05 ENCOUNTER — PATIENT MESSAGE (OUTPATIENT)
Dept: FAMILY MEDICINE CLINIC | Facility: CLINIC | Age: 29
End: 2019-07-05

## 2019-07-05 DIAGNOSIS — Z83.49 FAMILY HISTORY OF THYROID DISEASE: ICD-10-CM

## 2019-07-05 DIAGNOSIS — R59.9 LYMPH NODE ENLARGEMENT: ICD-10-CM

## 2019-07-05 DIAGNOSIS — R53.82 CHRONIC FATIGUE: ICD-10-CM

## 2019-07-05 PROCEDURE — 76536 US EXAM OF HEAD AND NECK: CPT | Performed by: FAMILY MEDICINE

## 2019-07-05 NOTE — TELEPHONE ENCOUNTER
Dr Ludwig Velasco, I just want to double check the dose of the RX folic acid because I don't have an option for 0.4mg, however we can find 400mcg in Epic. But over the counter folic acid can go up to 1000mcg (1mg) so I wasn't sure why we were sending a script.

## 2019-07-05 NOTE — TELEPHONE ENCOUNTER
She doesn't need rx - can get otc  She can take up to 1mg per day   Mild folate deficiency - so she can take anywhere from 0.4mg to 1mg daily

## 2019-07-05 NOTE — TELEPHONE ENCOUNTER
[employer] msg sent. *Attempted to contact pt earlier today x2 with no answer for other results. Msg flagged to assure pt received.

## 2019-07-05 NOTE — TELEPHONE ENCOUNTER
US THYROID  Notes recorded by Aaliyah Caal MD on 7/5/2019 at 3:16 PM CDT  Results reviewed. Tests show no significant abnormalities. Benign appearing thyroid nodule. No enlargement of thyroid gland. No abn LN. Please inform patient.

## 2019-07-05 NOTE — TELEPHONE ENCOUNTER
2ND attempt made to contact pt. GRAHAM for pt informing her that her results and instructions would be sent to [employer]. Pt instructed to contact the office if she has further questions or concerns.

## 2019-07-08 NOTE — TELEPHONE ENCOUNTER
From: Beatriz Bustillo  To: Ginette Sims MD  Sent: 7/5/2019 3:31 PM CDT  Subject: Test Results Question    I have received both the ultrasound and bloodwork results. I will start taking the folic acid daily.  Thank you

## 2019-07-08 NOTE — TELEPHONE ENCOUNTER
From: Beatriz Gonzalez  To: Massiel Escobar MD  Sent: 7/5/2019  3:31 PM CDT  Subject: Test Results Question    I have received both the ultrasound and bloodwork results. I will start taking the folic acid daily.  Thank you

## 2019-08-10 ENCOUNTER — WALK IN (OUTPATIENT)
Dept: URGENT CARE | Age: 29
End: 2019-08-10

## 2019-08-10 VITALS
HEIGHT: 66 IN | SYSTOLIC BLOOD PRESSURE: 110 MMHG | RESPIRATION RATE: 16 BRPM | BODY MASS INDEX: 21.08 KG/M2 | DIASTOLIC BLOOD PRESSURE: 70 MMHG | HEART RATE: 70 BPM | WEIGHT: 131.17 LBS | TEMPERATURE: 98.1 F

## 2019-08-10 DIAGNOSIS — J02.9 SORE THROAT: Primary | ICD-10-CM

## 2019-08-10 DIAGNOSIS — J01.91 ACUTE RECURRENT SINUSITIS, UNSPECIFIED LOCATION: ICD-10-CM

## 2019-08-10 PROCEDURE — 99203 OFFICE O/P NEW LOW 30 MIN: CPT | Performed by: NURSE PRACTITIONER

## 2019-08-10 RX ORDER — AZITHROMYCIN 500 MG/1
500 TABLET, FILM COATED ORAL DAILY
Qty: 5 TABLET | Refills: 0 | Status: SHIPPED | OUTPATIENT
Start: 2019-08-10 | End: 2019-08-15

## 2019-08-10 SDOH — HEALTH STABILITY: MENTAL HEALTH: HOW OFTEN DO YOU HAVE A DRINK CONTAINING ALCOHOL?: NEVER

## 2019-08-10 ASSESSMENT — ENCOUNTER SYMPTOMS
COUGH: 1
FATIGUE: 1
FEVER: 1

## 2020-11-02 ENCOUNTER — OFFICE VISIT (OUTPATIENT)
Dept: FAMILY MEDICINE CLINIC | Facility: CLINIC | Age: 30
End: 2020-11-02
Payer: COMMERCIAL

## 2020-11-02 VITALS
WEIGHT: 125 LBS | HEART RATE: 80 BPM | RESPIRATION RATE: 14 BRPM | HEIGHT: 66 IN | OXYGEN SATURATION: 98 % | DIASTOLIC BLOOD PRESSURE: 68 MMHG | TEMPERATURE: 99 F | BODY MASS INDEX: 20.09 KG/M2 | SYSTOLIC BLOOD PRESSURE: 100 MMHG

## 2020-11-02 DIAGNOSIS — Z20.822 SUSPECTED 2019 NOVEL CORONAVIRUS INFECTION: Primary | ICD-10-CM

## 2020-11-02 DIAGNOSIS — J01.40 ACUTE NON-RECURRENT PANSINUSITIS: ICD-10-CM

## 2020-11-02 PROCEDURE — 3008F BODY MASS INDEX DOCD: CPT | Performed by: NURSE PRACTITIONER

## 2020-11-02 PROCEDURE — 3078F DIAST BP <80 MM HG: CPT | Performed by: NURSE PRACTITIONER

## 2020-11-02 PROCEDURE — 99213 OFFICE O/P EST LOW 20 MIN: CPT | Performed by: NURSE PRACTITIONER

## 2020-11-02 PROCEDURE — 3074F SYST BP LT 130 MM HG: CPT | Performed by: NURSE PRACTITIONER

## 2020-11-02 RX ORDER — AZITHROMYCIN 250 MG/1
TABLET, FILM COATED ORAL
Qty: 6 TABLET | Refills: 0 | Status: SHIPPED | OUTPATIENT
Start: 2020-11-02 | End: 2020-11-07

## 2020-11-02 NOTE — PROGRESS NOTES
CHIEF COMPLAINT:   Patient presents with:  Sinus Problem      HPI:   Charles Doctor is a 31year old female who presents for cold symptoms for  2  days.  Head feels like \"weighs 25#\", feels dizzy, nausea, when moves head, feeling hot/cold, sinus pres abnormal skin lesions  HEENT: See HPI. LUNGS: denies shortness of breath or wheezing, See HPI  CARDIOVASCULAR: denies chest pain or palpitations   GI: denies N/V/C or abdominal pain  NEURO: + sinus headaches. No numbness or tingling in face.     EXAM: drain.   Sinusitis can often be managed with self-care. Self-care can keep sinuses moist and make you feel more comfortable. Remember to follow your doctor's instructions closely. This can make a big difference in getting your sinus problem under control. Health is committed to the safety and well-being of our patients, members, employees, and communities.  As concerns arise about the new strain of coronavirus that causes COVID-19, Rani Augustin is here to provide community members reliable answers of time and tell them that you have or may have COVID-19.  5. For medical emergencies, call 911 and notify the dispatch personnel that you have or may have COVID-19.   6. Cover your cough and sneezes.    7. Wash your hands often with soap and water for at l asymptomatic patient or date of symptom onset is unclear then use 10 days post COVID test date. · At least 20 days have passed for severe illness (requiring hospitalization) OR if you are immunocompromised.   If you have a fever with cough or shortness of eligible to donate. If you’re instructed by Lamont Dimas that a repeat test is required, please contact the [facility] COVID-19 Nurse Triage Line at [phone].     Additional Information      You can also get more information at the following websites:

## 2020-11-02 NOTE — PATIENT INSTRUCTIONS
Self-Care for Sinusitis     Drinking plenty of water can help sinuses drain. Sinusitis can often be managed with self-care. Self-care can keep sinuses moist and make you feel more comfortable. Remember to follow your doctor's instructions closely.  This instructions. Coronavirus Disease 2019 (COVID-19)     [facility] is committed to the safety and well-being of our patients, members, employees, and communities.  As concerns arise about the new strain of coronavirus that causes COVID-19, Ed 4. If you have a medical appointment, call the healthcare provider ahead of time and tell them that you have or may have COVID-19.  5. For medical emergencies, call 911 and notify the dispatch personnel that you have or may have COVID-19.   6. Cover your · At least 10 days have passed since symptoms first appeared OR if asymptomatic patient or date of symptom onset is unclear then use 10 days post COVID test date.    · At least 20 days have passed for severe illness (requiring hospitalization) OR if you ar people will be required to have a repeat COVID-19 test in order to be eligible to donate. If you’re instructed by Darnell Loya that a repeat test is required, please contact the [facility] COVID-19 Nurse Triage Line at [phone].     Additional Informati

## 2020-11-09 ENCOUNTER — TELEPHONE (OUTPATIENT)
Dept: FAMILY MEDICINE CLINIC | Facility: CLINIC | Age: 30
End: 2020-11-09

## 2020-11-09 NOTE — TELEPHONE ENCOUNTER
Pt was diagnosed with Covid, she has a little headache, she feels that when her BP drops she get dizzy and nauseated, she also has a little sinus pressure. Pt has no fever and is feeling a lot better.  Her 10 days will be up on Wed as the symptoms started o

## 2020-11-09 NOTE — TELEPHONE ENCOUNTER
Spoke to pt with instructions and she went to a clinic and was dx with sinus infection and did first 3 days of Zpak ( mon, Tuesday, Wednesday last week)  but was told that taking antibiotics can make covid worse so she did not take the last 2 days.   She go

## 2020-11-10 NOTE — TELEPHONE ENCOUNTER
Notified the patient of the below response by PCP. Patient verbalized understanding. Answered all of the patient's questions at this time.

## 2020-11-10 NOTE — TELEPHONE ENCOUNTER
Some patients have had tachycardia and hypotension symptoms after covid  She can wear compression socks during the day, increase salt intake (pretzels, nuts, etc for snacks) in addition to oral hydration.   Change position slowly - elevate legs when possibl

## 2020-11-10 NOTE — TELEPHONE ENCOUNTER
Spoke with patient via phone. Notified the patient of the below response by PCP. Patient verbalized understanding. Patient states that she is feeling \"much better. \" Educated the patient on ER precautions. Patient verbalized understanding.      Patient sta

## 2021-04-27 ENCOUNTER — OFFICE VISIT (OUTPATIENT)
Dept: FAMILY MEDICINE CLINIC | Facility: CLINIC | Age: 31
End: 2021-04-27
Payer: COMMERCIAL

## 2021-04-27 VITALS
RESPIRATION RATE: 16 BRPM | SYSTOLIC BLOOD PRESSURE: 100 MMHG | HEART RATE: 71 BPM | TEMPERATURE: 99 F | WEIGHT: 135 LBS | HEIGHT: 66 IN | BODY MASS INDEX: 21.69 KG/M2 | DIASTOLIC BLOOD PRESSURE: 60 MMHG | OXYGEN SATURATION: 99 %

## 2021-04-27 DIAGNOSIS — E61.0 COPPER DEFICIENCY: ICD-10-CM

## 2021-04-27 DIAGNOSIS — Z00.00 LABORATORY EXAMINATION ORDERED AS PART OF A ROUTINE GENERAL MEDICAL EXAMINATION: ICD-10-CM

## 2021-04-27 DIAGNOSIS — E53.8 FOLIC ACID DEFICIENCY: ICD-10-CM

## 2021-04-27 DIAGNOSIS — Z01.84 IMMUNITY STATUS TESTING: ICD-10-CM

## 2021-04-27 DIAGNOSIS — R53.82 CHRONIC FATIGUE: ICD-10-CM

## 2021-04-27 DIAGNOSIS — Z00.00 ROUTINE PHYSICAL EXAMINATION: Primary | ICD-10-CM

## 2021-04-27 PROCEDURE — 3078F DIAST BP <80 MM HG: CPT | Performed by: FAMILY MEDICINE

## 2021-04-27 PROCEDURE — 3008F BODY MASS INDEX DOCD: CPT | Performed by: FAMILY MEDICINE

## 2021-04-27 PROCEDURE — 99395 PREV VISIT EST AGE 18-39: CPT | Performed by: FAMILY MEDICINE

## 2021-04-27 PROCEDURE — 3074F SYST BP LT 130 MM HG: CPT | Performed by: FAMILY MEDICINE

## 2021-04-27 NOTE — PROGRESS NOTES
Erin Matute is a 31year old female. CC:  Patient presents with:   Well Adult: no pap smear   Fatigue      HPI:  Pneumococcal Vaccine: Birth to 64yrs(1 of 1 - PPSV23) Never done  COVID-19 Vaccine(1) Never done  Annual Depression Screen due on 0 Current Meds:  No current outpatient medications on file prior to visit. No current facility-administered medications on file prior to visit.        History:  Past Medical History:   Diagnosis Date   • Amenorrhea     IUD   • Anemia     taking iron   • Co kg)      BP Readings from Last 3 Encounters:  04/27/21 : 100/60  11/02/20 : 100/68  06/29/19 : 100/60    REVIEW OF SYSTEMS:     GENERAL HEALTH: feels well, no fevers/chills, +fatigue  SKIN: denies any unusual skin lesions or rashes  EYES: no visual complai ordered as part of a routine general medical examination  - CBC WITH DIFFERENTIAL WITH PLATELET; Future  - COMP METABOLIC PANEL (14); Future  - LIPID PANEL; Future  - TSH W REFLEX TO FREE T4; Future  - VITAMIN D, 25-HYDROXY; Future    3.  Chronic fatigue  -

## 2021-05-01 ENCOUNTER — LAB ENCOUNTER (OUTPATIENT)
Dept: LAB | Age: 31
End: 2021-05-01
Attending: FAMILY MEDICINE
Payer: COMMERCIAL

## 2021-05-01 DIAGNOSIS — E53.8 FOLIC ACID DEFICIENCY: ICD-10-CM

## 2021-05-01 DIAGNOSIS — E61.0 COPPER DEFICIENCY: ICD-10-CM

## 2021-05-01 DIAGNOSIS — R53.82 CHRONIC FATIGUE: ICD-10-CM

## 2021-05-01 DIAGNOSIS — Z01.84 IMMUNITY STATUS TESTING: ICD-10-CM

## 2021-05-01 DIAGNOSIS — Z00.00 LABORATORY EXAMINATION ORDERED AS PART OF A ROUTINE GENERAL MEDICAL EXAMINATION: ICD-10-CM

## 2021-05-01 PROCEDURE — 86706 HEP B SURFACE ANTIBODY: CPT

## 2021-05-01 PROCEDURE — 82525 ASSAY OF COPPER: CPT

## 2021-05-01 PROCEDURE — 83540 ASSAY OF IRON: CPT

## 2021-05-01 PROCEDURE — 82746 ASSAY OF FOLIC ACID SERUM: CPT

## 2021-05-01 PROCEDURE — 36415 COLL VENOUS BLD VENIPUNCTURE: CPT

## 2021-05-01 PROCEDURE — 82306 VITAMIN D 25 HYDROXY: CPT

## 2021-05-01 PROCEDURE — 83550 IRON BINDING TEST: CPT

## 2021-05-01 PROCEDURE — 85025 COMPLETE CBC W/AUTO DIFF WBC: CPT

## 2021-05-01 PROCEDURE — 84443 ASSAY THYROID STIM HORMONE: CPT

## 2021-05-01 PROCEDURE — 80061 LIPID PANEL: CPT

## 2021-05-01 PROCEDURE — 82728 ASSAY OF FERRITIN: CPT

## 2021-05-01 PROCEDURE — 80053 COMPREHEN METABOLIC PANEL: CPT

## 2021-06-10 ENCOUNTER — HOSPITAL ENCOUNTER (OUTPATIENT)
Dept: GENERAL RADIOLOGY | Age: 31
Discharge: HOME | End: 2021-06-10
Attending: FAMILY MEDICINE
Payer: COMMERCIAL

## 2021-06-10 ENCOUNTER — PATIENT MESSAGE (OUTPATIENT)
Dept: FAMILY MEDICINE CLINIC | Facility: CLINIC | Age: 31
End: 2021-06-10

## 2021-06-10 ENCOUNTER — OFFICE VISIT (OUTPATIENT)
Dept: FAMILY MEDICINE CLINIC | Facility: CLINIC | Age: 31
End: 2021-06-10
Payer: COMMERCIAL

## 2021-06-10 VITALS
HEIGHT: 66 IN | BODY MASS INDEX: 22.34 KG/M2 | TEMPERATURE: 99 F | HEART RATE: 83 BPM | WEIGHT: 139 LBS | RESPIRATION RATE: 16 BRPM | DIASTOLIC BLOOD PRESSURE: 68 MMHG | SYSTOLIC BLOOD PRESSURE: 110 MMHG | OXYGEN SATURATION: 98 %

## 2021-06-10 DIAGNOSIS — M25.562 ACUTE PAIN OF LEFT KNEE: Primary | ICD-10-CM

## 2021-06-10 DIAGNOSIS — M25.562 ACUTE PAIN OF LEFT KNEE: ICD-10-CM

## 2021-06-10 PROCEDURE — 3008F BODY MASS INDEX DOCD: CPT | Performed by: FAMILY MEDICINE

## 2021-06-10 PROCEDURE — 73560 X-RAY EXAM OF KNEE 1 OR 2: CPT | Performed by: FAMILY MEDICINE

## 2021-06-10 PROCEDURE — 3074F SYST BP LT 130 MM HG: CPT | Performed by: FAMILY MEDICINE

## 2021-06-10 PROCEDURE — 3078F DIAST BP <80 MM HG: CPT | Performed by: FAMILY MEDICINE

## 2021-06-10 PROCEDURE — 99214 OFFICE O/P EST MOD 30 MIN: CPT | Performed by: FAMILY MEDICINE

## 2021-06-10 RX ORDER — MELOXICAM 10 MG/1
1 CAPSULE ORAL DAILY
Qty: 30 CAPSULE | Refills: 0 | Status: SHIPPED | OUTPATIENT
Start: 2021-06-10 | End: 2021-07-10

## 2021-06-10 NOTE — TELEPHONE ENCOUNTER
From: Beatriz Browning  To: Meme Lira MD  Sent: 6/10/2021 7:38 AM CDT  Subject: Referral Request    On 5/29/21 I twisted my left knee and fell on it. There was never any bruising or major swelling.  I have been icing, taping, elevating and taking ibup

## 2021-06-10 NOTE — PATIENT INSTRUCTIONS
Knee Pain with Uncertain Cause    There are several common causes for knee pain.  These can include:  · A sprain of the ligaments that support the joint  · An injury to the cartilage lining of the joint  · Arthritis from wear-and-tear or inflammation  The provider as advised. This is usually within 1 to 2 weeks.   If X-rays were taken, you will be told of any new findings that may affect your care  Call 911  Call 911 if you have:  · Shortness of breath  · Chest pain  When to seek medical advice  Call your he

## 2021-06-10 NOTE — PROGRESS NOTES
[facility] Family Medicine Office Note  Chief Complaint:   Patient presents with:  Knee Pain: c/o LT knee pain after  sliding last Saturday. Pt has used ICE  20 mins on and off  with not relief.  Pt notes pain increases when turning knee with 8/10 Father    • Other (Other) Father         blood disorder   • Cancer Mother         cervical   • Psychiatric Mother    • Heart Disorder Paternal Grandmother      Allergies:    Hydrocodone             FATIGUE, HIVES, ITCHING  Current Meds:  Current Outpatient 1st digit contusion healing  Neurological:      Mental Status: She is alert and oriented to person, place, and time. Sensory: No sensory deficit. Psychiatric:         Mood and Affect: Mood normal.          ASSESSMENT AND PLAN:   1.  Acute pain of lef

## 2021-06-11 ENCOUNTER — TELEPHONE (OUTPATIENT)
Dept: FAMILY MEDICINE CLINIC | Facility: CLINIC | Age: 31
End: 2021-06-11

## 2021-06-11 NOTE — TELEPHONE ENCOUNTER
----- Message from Benny Conway MD sent at 6/11/2021 10:52 AM CDT -----  Knee XR negative for fracture/effusion. Will order MRI for further evaluation.

## 2021-06-14 ENCOUNTER — HOSPITAL ENCOUNTER (OUTPATIENT)
Dept: MRI IMAGING | Age: 31
Discharge: HOME | End: 2021-06-14
Attending: FAMILY MEDICINE
Payer: COMMERCIAL

## 2021-06-14 DIAGNOSIS — M25.562 ACUTE PAIN OF LEFT KNEE: ICD-10-CM

## 2021-06-14 PROCEDURE — 73721 MRI JNT OF LWR EXTRE W/O DYE: CPT | Performed by: FAMILY MEDICINE

## 2021-06-16 ENCOUNTER — TELEPHONE (OUTPATIENT)
Dept: FAMILY MEDICINE CLINIC | Facility: CLINIC | Age: 31
End: 2021-06-16

## 2021-06-16 RX ORDER — MELOXICAM 7.5 MG/1
7.5 TABLET ORAL DAILY PRN
Qty: 30 TABLET | Refills: 0 | Status: SHIPPED | OUTPATIENT
Start: 2021-06-16 | End: 2021-07-11

## 2021-06-16 RX ORDER — MELOXICAM 15 MG/1
15 TABLET ORAL DAILY
Qty: 30 TABLET | Refills: 0 | Status: CANCELLED | OUTPATIENT
Start: 2021-06-16

## 2021-06-16 NOTE — TELEPHONE ENCOUNTER
----- Message from Magdiel Wei MD sent at 6/16/2021  7:51 AM CDT -----  Appears consistent with acute low grade MCL sprain and meniscus. Follow up with physical therapy as we previously discussed. Continue with RICE protocol.

## 2021-06-16 NOTE — TELEPHONE ENCOUNTER
Fax received from [facility] pharmacy states Pt has requested  Meloxicam refill to be sent to this pharmacy instead of [facility] previously listed / per ins coverage . Called Pt who noted Meloxicam 10 mg cap is more expensive.  Pt requesting 7.5 mg or 15 mg tab in

## 2021-07-11 RX ORDER — MELOXICAM 7.5 MG/1
TABLET ORAL
Qty: 30 TABLET | Refills: 0 | Status: SHIPPED | OUTPATIENT
Start: 2021-07-11 | End: 2021-08-19

## 2021-08-19 ENCOUNTER — TELEMEDICINE (OUTPATIENT)
Dept: FAMILY MEDICINE CLINIC | Facility: CLINIC | Age: 31
End: 2021-08-19

## 2021-08-19 DIAGNOSIS — M79.671 RIGHT FOOT PAIN: ICD-10-CM

## 2021-08-19 DIAGNOSIS — S83.412D SPRAIN OF MEDIAL COLLATERAL LIGAMENT OF LEFT KNEE, SUBSEQUENT ENCOUNTER: Primary | ICD-10-CM

## 2021-08-19 DIAGNOSIS — Z71.85 VACCINE COUNSELING: ICD-10-CM

## 2021-08-19 PROCEDURE — 99214 OFFICE O/P EST MOD 30 MIN: CPT | Performed by: FAMILY MEDICINE

## 2021-08-19 RX ORDER — MELOXICAM 7.5 MG/1
7.5 TABLET ORAL DAILY PRN
Qty: 30 TABLET | Refills: 0 | Status: SHIPPED | OUTPATIENT
Start: 2021-08-19

## 2021-08-19 NOTE — PROGRESS NOTES
Virtual Telephone Check-In    [address] [place] [address] verbally consents to a Virtual/Telephone Check-In visit on 08/19/21. Patient has been referred to the [facility] website at [email or web address] to review the yearly Consent to Treat document.     Patient und

## 2021-09-17 ENCOUNTER — TELEPHONE (OUTPATIENT)
Dept: FAMILY MEDICINE CLINIC | Facility: CLINIC | Age: 31
End: 2021-09-17

## 2021-09-17 ENCOUNTER — HOSPITAL ENCOUNTER (OUTPATIENT)
Dept: GENERAL RADIOLOGY | Age: 31
Discharge: HOME | End: 2021-09-17
Attending: FAMILY MEDICINE
Payer: COMMERCIAL

## 2021-09-17 DIAGNOSIS — M79.671 RIGHT FOOT PAIN: Primary | ICD-10-CM

## 2021-09-17 DIAGNOSIS — M79.671 RIGHT FOOT PAIN: ICD-10-CM

## 2021-09-17 PROCEDURE — 73630 X-RAY EXAM OF FOOT: CPT | Performed by: FAMILY MEDICINE

## 2021-09-17 NOTE — TELEPHONE ENCOUNTER
----- Message from Cory Rodrigues MD sent at 9/17/2021  2:42 PM CDT -----  Healing fracture of great toe.  [id number] for podiatry referral.

## 2021-10-15 ENCOUNTER — TELEMEDICINE (OUTPATIENT)
Dept: FAMILY MEDICINE CLINIC | Facility: CLINIC | Age: 31
End: 2021-10-15

## 2021-10-15 DIAGNOSIS — F32.A DEPRESSIVE DISORDER: Primary | ICD-10-CM

## 2021-10-15 PROCEDURE — 99214 OFFICE O/P EST MOD 30 MIN: CPT | Performed by: FAMILY MEDICINE

## 2021-10-15 NOTE — PROGRESS NOTES
Virtual Telephone Check-In    [address] [place] [address] verbally consents to a Virtual/Telephone Check-In visit on 10/15/21. Patient has been referred to the Health system website at [email or web address] to review the yearly Consent to Treat document.     Patient und

## 2021-10-17 NOTE — TELEPHONE ENCOUNTER
[facility] 4TH FLR  [address] [facility] [address]      Sosa Elliott :1963 MRN:[id number]    10/13/2021 Time Session Began: 8:59 am   Time Session Ended: 9:32 am     Due to COVID-19 precautions, this visit was performed via live interactive two-way Video visit with patient's verbal consent.  Video was interrupted at 9:12 am by loss of connection and was completed by telephone.    Clinician Location:Home.  Patient Location: Home.  Verified patient identity:  [x] Yes    Session Type:Therapy 16-37 minutes (91311)    Others Present: Angela, daughter    Intervention: Cognitive, Supportive    Suicide/Homicide/Violence Ideation: No    If Yes, explain:     Current Outpatient Medications   Medication Sig   • ALPRAZolam (XANAX) 2 MG tablet Take 1 tablet by mouth 4 times daily as needed for Anxiety. Do not start before 2021.   • busPIRone (BUSPAR) 15 MG tablet Take 1 and one-half tablets (22.5 mg) by mouth 3 times daily.   • prazosin (MINIPRESS) 1 MG capsule Take 1 capsule by mouth nightly.   • simvastatin (ZOCOR) 10 MG tablet TAKE 1 TABLET BY MOUTH EVERY DAY AT NIGHT   • Budesonide ER (UCERIS) 9 MG TABLET SR 24 HR Take 1 tablet by mouth daily.   • LINZESS 290 MCG TAKE 1 CAPSULE BY MOUTH DAILY   • isosorbide mononitrate (IMDUR) 30 MG 24 hr tablet TAKE 1 TABLET BY MOUTH EVERY DAY   • omeprazole (PRILOSEC) 20 MG capsule Take 1 capsule by mouth daily.   • methotrexate (RHEUMATREX) 2.5 MG tablet Take 6 tablets by mouth 1 day a week.   • fluticasone-salmeterol (ADVAIR DISKUS) 100-50 MCG/DOSE inhaler Inhale 1 puff into the lungs two times daily. Rinse mouth after use.   • fluticasone (FLONASE ALLERGY RELIEF) 50 MCG/ACT nasal spray Spray 1 spray in each nostril as needed (nasal symptoms).   • folic acid (FOLATE) 1 MG tablet TAKE 1 TABLET BY MOUTH DAILY.   • nitroGLYcerin (NITROSTAT) 0.4 MG sublingual tablet PLACE 1 TABLET UNDER THE  Try microgestin TONGUE EVERY 5 MINUTES AS NEEDED FOR CHEST PAIN.   • loratadine (CLARITIN) 10 MG tablet Take 1 tablet by mouth as needed for Allergies.   • carvedilol (COREG) 6.25 MG tablet Take 1 tablet by mouth 2 times daily (with meals).   • ramipril (ALTACE) 2.5 MG capsule Take 1 capsule by mouth daily.   • Vitamin D, Ergocalciferol, 69807 units capsule Take 1 capsule by mouth 1 day a week. x 6 months then recheck levels   • VITAMIN E PO Take 1 tablet by mouth daily.   • Acetaminophen (TYLENOL PO) Take 1 tablet by mouth every 4 hours as needed.   • albuterol 108 (90 Base) MCG/ACT inhaler Inhale 2 puffs into the lungs every 4 hours as needed for Shortness of Breath or Wheezing.     No current facility-administered medications for this visit.       Change in Medication(s) Reported: N/A  If Yes, explain:     Patient/Family Education Provided: Yes  Patient/Family Displays Understanding: Yes    If No, explain:     Chief complaint in patient's own words: \"pain\"    Progress Note containing chief complaint and symptoms/problems related to the complaint:    (Data/Action/Response/Plan)    D: Client reported fewer problems with her son coming into her with a demanding attitude. He no longer has a car to drive to her house.  This gives her more peace in her home. She states she continues to be in pain and is having trouble with breathing. She has an appointment scheduled with her doctor tomorrow.  She states the pain from her rheumatoid arthritis becomes particularly bad at night. She needs to move around to relieve the pain. Her  doesn't understand. He wants her to stay in bed because it wakes him up when she leaves. She has tried to explain it to him but he still doesn't understand.   A: Client was offered support and understanding. This therapist affirmed that it does help to get up and move around and education was provider. This provider will send client information regarding what it is like to have rheumatoid arthritis so she  can share it to her .   R:Client was engaged in therapy and receptive to interventions. She states she continues to use the sunroom and care for her plants.   P: Individual therapy twice a month.     Need for Community Resources Assessed: Yes    Resources Needed: No    If Yes, what resources:     Primary Diagnosis: Major depressive disorder, recurrent, severe without psychosis  Treatment Plan: Unchanged    Discharge Plan: N/A    Next Appointment: 11/3/2021    Dora Luque LCSW

## 2021-10-18 PROBLEM — F32.A DEPRESSIVE DISORDER: Status: ACTIVE | Noted: 2021-10-18

## 2021-11-10 ENCOUNTER — TELEMEDICINE (OUTPATIENT)
Dept: FAMILY MEDICINE CLINIC | Facility: CLINIC | Age: 31
End: 2021-11-10

## 2021-11-10 DIAGNOSIS — F32.A DEPRESSIVE DISORDER: Primary | ICD-10-CM

## 2021-11-10 PROCEDURE — 99214 OFFICE O/P EST MOD 30 MIN: CPT | Performed by: FAMILY MEDICINE

## 2021-11-10 NOTE — PROGRESS NOTES
Virtual Telephone Check-In    [address] [place] [address] verbally consents to a Virtual/Telephone Check-In visit on 11/10/21. Patient has been referred to the [facility] website at [email or web address] to review the yearly Consent to Treat document.     Patient und

## 2021-12-07 NOTE — PROGRESS NOTES
Mom and dad went to NICU to see baby and complete a feed. The patient called stating she has completed physical therapy, however, she has noticed her hip pain is worse than it was prior.     Please contact Inez at [phone] to advise.

## 2022-01-10 ENCOUNTER — TELEPHONE (OUTPATIENT)
Dept: OBGYN CLINIC | Facility: CLINIC | Age: 32
End: 2022-01-10

## 2022-01-10 NOTE — TELEPHONE ENCOUNTER
Patient notified that it is hard to tell if her pain is related to her IUD  She should schedule an appointment to discuss more  Patient denies bleeding or severe pain  Stated the pain was severe yesterday, but is better today  She knows to call or go the E

## 2022-01-10 NOTE — TELEPHONE ENCOUNTER
Patient states she had IUD placed about 3 years ago and she is now experiencing stabbing pains.     Thank you

## 2022-01-13 ENCOUNTER — TELEMEDICINE (OUTPATIENT)
Dept: FAMILY MEDICINE CLINIC | Facility: CLINIC | Age: 32
End: 2022-01-13
Payer: COMMERCIAL

## 2022-01-13 DIAGNOSIS — R10.2 PELVIC PAIN: Primary | ICD-10-CM

## 2022-01-13 DIAGNOSIS — F32.A DEPRESSIVE DISORDER: ICD-10-CM

## 2022-01-13 PROCEDURE — 99213 OFFICE O/P EST LOW 20 MIN: CPT | Performed by: FAMILY MEDICINE

## 2022-01-13 NOTE — PROGRESS NOTES
Virtual Telephone Check-In    [address] [place] [address] verbally consents to a Virtual/Telephone Check-In visit on 01/13/22. Patient has been referred to the [facility] website at [email or web address] to review the yearly Consent to Treat document.     Patient und

## 2022-01-15 NOTE — PROGRESS NOTES
[facility] Patient resting comfortably in bed. POC discussed with patient. All questions answered. Patient verbalized understanding. Call light within reach. Unresponsiveness, Intracranial hemorrhage

## 2022-01-25 ENCOUNTER — HOSPITAL ENCOUNTER (OUTPATIENT)
Dept: ULTRASOUND IMAGING | Age: 32
Discharge: HOME | End: 2022-01-25
Attending: FAMILY MEDICINE
Payer: COMMERCIAL

## 2022-01-25 DIAGNOSIS — R10.2 PELVIC PAIN: ICD-10-CM

## 2022-01-25 PROCEDURE — 76856 US EXAM PELVIC COMPLETE: CPT | Performed by: FAMILY MEDICINE

## 2022-01-25 PROCEDURE — 76830 TRANSVAGINAL US NON-OB: CPT | Performed by: FAMILY MEDICINE

## 2022-01-25 PROCEDURE — 93975 VASCULAR STUDY: CPT | Performed by: FAMILY MEDICINE

## 2022-01-26 ENCOUNTER — TELEPHONE (OUTPATIENT)
Dept: FAMILY MEDICINE CLINIC | Facility: CLINIC | Age: 32
End: 2022-01-26

## 2022-01-26 NOTE — TELEPHONE ENCOUNTER
----- Message from Rafa Murphy MD sent at 1/25/2022  6:50 PM CST -----  Normal pelvic ultrasound with IUD in place.

## 2022-02-21 ENCOUNTER — OFFICE VISIT (OUTPATIENT)
Dept: OBGYN CLINIC | Facility: CLINIC | Age: 32
End: 2022-02-21
Payer: COMMERCIAL

## 2022-02-21 VITALS
SYSTOLIC BLOOD PRESSURE: 110 MMHG | DIASTOLIC BLOOD PRESSURE: 76 MMHG | HEIGHT: 66 IN | WEIGHT: 139.19 LBS | BODY MASS INDEX: 22.37 KG/M2 | HEART RATE: 72 BPM

## 2022-02-21 DIAGNOSIS — Z01.419 WELL WOMAN EXAM WITH ROUTINE GYNECOLOGICAL EXAM: Primary | ICD-10-CM

## 2022-02-21 DIAGNOSIS — Z12.4 SCREENING FOR MALIGNANT NEOPLASM OF CERVIX: ICD-10-CM

## 2022-02-21 DIAGNOSIS — Z30.431 IUD CHECK UP: ICD-10-CM

## 2022-02-21 PROCEDURE — 3008F BODY MASS INDEX DOCD: CPT | Performed by: OBSTETRICS & GYNECOLOGY

## 2022-02-21 PROCEDURE — 88175 CYTOPATH C/V AUTO FLUID REDO: CPT | Performed by: OBSTETRICS & GYNECOLOGY

## 2022-02-21 PROCEDURE — 3074F SYST BP LT 130 MM HG: CPT | Performed by: OBSTETRICS & GYNECOLOGY

## 2022-02-21 PROCEDURE — 99395 PREV VISIT EST AGE 18-39: CPT | Performed by: OBSTETRICS & GYNECOLOGY

## 2022-02-21 PROCEDURE — 3078F DIAST BP <80 MM HG: CPT | Performed by: OBSTETRICS & GYNECOLOGY

## 2022-05-01 ENCOUNTER — IMAGING SERVICES (OUTPATIENT)
Dept: GENERAL RADIOLOGY | Age: 32
End: 2022-05-01
Attending: FAMILY MEDICINE

## 2022-05-01 ENCOUNTER — IMAGING SERVICES (OUTPATIENT)
Dept: GENERAL RADIOLOGY | Age: 32
End: 2022-05-01
Attending: INTERNAL MEDICINE

## 2022-05-01 ENCOUNTER — WALK IN (OUTPATIENT)
Dept: URGENT CARE | Age: 32
End: 2022-05-01

## 2022-05-01 VITALS
SYSTOLIC BLOOD PRESSURE: 104 MMHG | HEART RATE: 72 BPM | TEMPERATURE: 97.9 F | OXYGEN SATURATION: 99 % | DIASTOLIC BLOOD PRESSURE: 76 MMHG | RESPIRATION RATE: 16 BRPM

## 2022-05-01 DIAGNOSIS — S69.92XA INJURY OF LEFT WRIST, INITIAL ENCOUNTER: ICD-10-CM

## 2022-05-01 DIAGNOSIS — S63.502A SPRAIN OF LEFT WRIST, INITIAL ENCOUNTER: Primary | ICD-10-CM

## 2022-05-01 PROCEDURE — S8451 SPLINT WRIST OR ANKLE: HCPCS | Performed by: FAMILY MEDICINE

## 2022-05-01 PROCEDURE — 99214 OFFICE O/P EST MOD 30 MIN: CPT | Performed by: FAMILY MEDICINE

## 2022-05-01 PROCEDURE — 73110 X-RAY EXAM OF WRIST: CPT | Performed by: RADIOLOGY

## 2022-05-01 ASSESSMENT — PAIN SCALES - GENERAL: PAINLEVEL: 9

## 2022-05-09 DIAGNOSIS — F32.A DEPRESSIVE DISORDER: ICD-10-CM

## 2022-05-12 ENCOUNTER — TELEMEDICINE (OUTPATIENT)
Dept: FAMILY MEDICINE CLINIC | Facility: CLINIC | Age: 32
End: 2022-05-12

## 2022-05-12 DIAGNOSIS — Z00.00 LABORATORY EXAM ORDERED AS PART OF ROUTINE GENERAL MEDICAL EXAMINATION: ICD-10-CM

## 2022-05-12 DIAGNOSIS — S69.92XA INJURY OF LEFT WRIST, INITIAL ENCOUNTER: ICD-10-CM

## 2022-05-12 DIAGNOSIS — F32.A DEPRESSIVE DISORDER: Primary | ICD-10-CM

## 2022-05-12 PROCEDURE — 99214 OFFICE O/P EST MOD 30 MIN: CPT | Performed by: FAMILY MEDICINE

## 2022-05-12 NOTE — PROGRESS NOTES
Virtual Telephone Check-In    [address] [place] [address] verbally consents to a Virtual/Telephone Check-In visit on 05/12/22. Patient has been referred to the [facility] website at [email or web address] to review the yearly Consent to Treat document. Patient understands and accepts financial responsibility for any deductible, co-insurance and/or co-pays associated with this service. Duration of the service: 10 minutes      Summary of topics discussed:     HPI:  Depression - stable on sertraline. No complications. Tolerating well w/o side effects     L wrist sprain- seen in [facility] on 05/1 for L wrist pain 2/2 roller skating fall. Imaging negative for fx. Discharged with wrist brace, ibuprofen. Feeling better but still reports pain. Denies any new sx. Does do typing at work which exacerbates it. ROS: pertinent positives and negatives as per HPI     PE:   Gen: AAOx3  Resp: breathing well on exam, normal effort. Able to speak full sentences without any SOB    Diagnoses and all orders for this visit:    Depressive disorder  Well controlled on sertraline. Tolerating well w/o side effects. No HI/SI. F/u in 6mo for annual physical/follow up. -     sertraline 50 MG Oral Tab; Take 1 tablet (50 mg total) by mouth daily. Injury of left wrist, initial encounter  L wrist sprain - will refer for therapy. Continue supportive care mgmt prn. F/u if no improvement in next 2-3 wks. -     OP REFERRAL TO [facility]    Laboratory exam ordered as part of routine general medical examination  -     CBC WITH DIFFERENTIAL WITH PLATELET; Future  -     COMP METABOLIC PANEL (14); Future  -     LIPID PANEL;  Future  -     TSH W REFLEX TO FREE T4; Future          Carrie Morales MD

## 2022-09-12 ENCOUNTER — LAB ENCOUNTER (OUTPATIENT)
Dept: LAB | Age: 32
End: 2022-09-12
Attending: FAMILY MEDICINE
Payer: COMMERCIAL

## 2022-09-12 DIAGNOSIS — Z00.00 LABORATORY EXAM ORDERED AS PART OF ROUTINE GENERAL MEDICAL EXAMINATION: ICD-10-CM

## 2022-09-12 DIAGNOSIS — R71.8 MICROCYTOSIS: ICD-10-CM

## 2022-09-12 DIAGNOSIS — R73.9 HYPERGLYCEMIA: ICD-10-CM

## 2022-09-12 LAB
ALBUMIN SERPL-MCNC: 4.3 G/DL (ref 3.4–5)
ALBUMIN/GLOB SERPL: 1.3 {RATIO} (ref 1–2)
ALP LIVER SERPL-CCNC: 72 U/L
ALT SERPL-CCNC: 16 U/L
ANION GAP SERPL CALC-SCNC: 5 MMOL/L (ref 0–18)
AST SERPL-CCNC: 13 U/L (ref 15–37)
BASOPHILS # BLD AUTO: 0.02 X10(3) UL (ref 0–0.2)
BASOPHILS NFR BLD AUTO: 0.3 %
BILIRUB SERPL-MCNC: 0.7 MG/DL (ref 0.1–2)
BUN BLD-MCNC: 9 MG/DL (ref 7–18)
CALCIUM BLD-MCNC: 9.4 MG/DL (ref 8.5–10.1)
CHLORIDE SERPL-SCNC: 107 MMOL/L (ref 98–112)
CHOLEST SERPL-MCNC: 131 MG/DL (ref ?–200)
CO2 SERPL-SCNC: 27 MMOL/L (ref 21–32)
CREAT BLD-MCNC: 0.71 MG/DL
EOSINOPHIL # BLD AUTO: 0.11 X10(3) UL (ref 0–0.7)
EOSINOPHIL NFR BLD AUTO: 1.7 %
ERYTHROCYTE [DISTWIDTH] IN BLOOD BY AUTOMATED COUNT: 14.6 %
FASTING PATIENT LIPID ANSWER: NO
FASTING STATUS PATIENT QL REPORTED: NO
GFR SERPLBLD BASED ON 1.73 SQ M-ARVRAT: 118 ML/MIN/1.73M2 (ref 60–?)
GLOBULIN PLAS-MCNC: 3.2 G/DL (ref 2.8–4.4)
GLUCOSE BLD-MCNC: 122 MG/DL (ref 70–99)
HCT VFR BLD AUTO: 39.8 %
HDLC SERPL-MCNC: 50 MG/DL (ref 40–59)
HGB BLD-MCNC: 12.4 G/DL
IMM GRANULOCYTES # BLD AUTO: 0.02 X10(3) UL (ref 0–1)
IMM GRANULOCYTES NFR BLD: 0.3 %
LDLC SERPL CALC-MCNC: 64 MG/DL (ref ?–100)
LYMPHOCYTES # BLD AUTO: 2.5 X10(3) UL (ref 1–4)
LYMPHOCYTES NFR BLD AUTO: 38.8 %
MCH RBC QN AUTO: 23.8 PG (ref 26–34)
MCHC RBC AUTO-ENTMCNC: 31.2 G/DL (ref 31–37)
MCV RBC AUTO: 76.2 FL
MONOCYTES # BLD AUTO: 0.39 X10(3) UL (ref 0.1–1)
MONOCYTES NFR BLD AUTO: 6.1 %
NEUTROPHILS # BLD AUTO: 3.4 X10 (3) UL (ref 1.5–7.7)
NEUTROPHILS # BLD AUTO: 3.4 X10(3) UL (ref 1.5–7.7)
NEUTROPHILS NFR BLD AUTO: 52.8 %
NONHDLC SERPL-MCNC: 81 MG/DL (ref ?–130)
OSMOLALITY SERPL CALC.SUM OF ELEC: 288 MOSM/KG (ref 275–295)
PLATELET # BLD AUTO: 188 10(3)UL (ref 150–450)
POTASSIUM SERPL-SCNC: 4.5 MMOL/L (ref 3.5–5.1)
PROT SERPL-MCNC: 7.5 G/DL (ref 6.4–8.2)
RBC # BLD AUTO: 5.22 X10(6)UL
SODIUM SERPL-SCNC: 139 MMOL/L (ref 136–145)
TRIGL SERPL-MCNC: 90 MG/DL (ref 30–149)
TSI SER-ACNC: 1.25 MIU/ML (ref 0.36–3.74)
VLDLC SERPL CALC-MCNC: 13 MG/DL (ref 0–30)
WBC # BLD AUTO: 6.4 X10(3) UL (ref 4–11)

## 2022-09-12 PROCEDURE — 82728 ASSAY OF FERRITIN: CPT

## 2022-09-12 PROCEDURE — 36415 COLL VENOUS BLD VENIPUNCTURE: CPT

## 2022-09-12 PROCEDURE — 84443 ASSAY THYROID STIM HORMONE: CPT

## 2022-09-12 PROCEDURE — 83550 IRON BINDING TEST: CPT

## 2022-09-12 PROCEDURE — 83540 ASSAY OF IRON: CPT

## 2022-09-12 PROCEDURE — 83036 HEMOGLOBIN GLYCOSYLATED A1C: CPT

## 2022-09-12 PROCEDURE — 85025 COMPLETE CBC W/AUTO DIFF WBC: CPT

## 2022-09-12 PROCEDURE — 80053 COMPREHEN METABOLIC PANEL: CPT

## 2022-09-12 PROCEDURE — 80061 LIPID PANEL: CPT

## 2022-09-14 LAB
EST. AVERAGE GLUCOSE BLD GHB EST-MCNC: 108 MG/DL (ref 68–126)
HBA1C MFR BLD: 5.4 % (ref ?–5.7)

## 2022-09-16 ENCOUNTER — LAB ENCOUNTER (OUTPATIENT)
Dept: LAB | Age: 32
End: 2022-09-16
Attending: FAMILY MEDICINE
Payer: COMMERCIAL

## 2022-09-16 ENCOUNTER — OFFICE VISIT (OUTPATIENT)
Dept: FAMILY MEDICINE CLINIC | Facility: CLINIC | Age: 32
End: 2022-09-16

## 2022-09-16 VITALS
WEIGHT: 142 LBS | OXYGEN SATURATION: 97 % | RESPIRATION RATE: 16 BRPM | DIASTOLIC BLOOD PRESSURE: 58 MMHG | HEIGHT: 66 IN | HEART RATE: 77 BPM | BODY MASS INDEX: 22.82 KG/M2 | SYSTOLIC BLOOD PRESSURE: 102 MMHG

## 2022-09-16 DIAGNOSIS — Z00.00 WELLNESS EXAMINATION: Primary | ICD-10-CM

## 2022-09-16 DIAGNOSIS — R53.83 OTHER FATIGUE: ICD-10-CM

## 2022-09-16 DIAGNOSIS — F32.A DEPRESSIVE DISORDER: ICD-10-CM

## 2022-09-16 LAB
DEPRECATED HBV CORE AB SER IA-ACNC: 66.5 NG/ML
IRON SATN MFR SERPL: 20 %
IRON SERPL-MCNC: 72 UG/DL
TIBC SERPL-MCNC: 365 UG/DL (ref 240–450)
TRANSFERRIN SERPL-MCNC: 245 MG/DL (ref 200–360)
VIT B12 SERPL-MCNC: 904 PG/ML (ref 193–986)
VIT D+METAB SERPL-MCNC: 39.7 NG/ML (ref 30–100)

## 2022-09-16 PROCEDURE — 3074F SYST BP LT 130 MM HG: CPT | Performed by: FAMILY MEDICINE

## 2022-09-16 PROCEDURE — 3008F BODY MASS INDEX DOCD: CPT | Performed by: FAMILY MEDICINE

## 2022-09-16 PROCEDURE — 82607 VITAMIN B-12: CPT

## 2022-09-16 PROCEDURE — 99214 OFFICE O/P EST MOD 30 MIN: CPT | Performed by: FAMILY MEDICINE

## 2022-09-16 PROCEDURE — 3078F DIAST BP <80 MM HG: CPT | Performed by: FAMILY MEDICINE

## 2022-09-16 PROCEDURE — 82306 VITAMIN D 25 HYDROXY: CPT

## 2022-09-16 PROCEDURE — 99395 PREV VISIT EST AGE 18-39: CPT | Performed by: FAMILY MEDICINE

## 2022-09-16 PROCEDURE — 36415 COLL VENOUS BLD VENIPUNCTURE: CPT

## 2022-09-16 RX ORDER — AMOXICILLIN 250 MG
CAPSULE ORAL
COMMUNITY
Start: 2022-08-27

## 2022-10-28 ENCOUNTER — PATIENT MESSAGE (OUTPATIENT)
Dept: FAMILY MEDICINE CLINIC | Facility: CLINIC | Age: 32
End: 2022-10-28

## 2022-10-28 ENCOUNTER — TELEMEDICINE (OUTPATIENT)
Dept: FAMILY MEDICINE CLINIC | Facility: CLINIC | Age: 32
End: 2022-10-28

## 2022-10-28 ENCOUNTER — HOSPITAL ENCOUNTER (OUTPATIENT)
Dept: CT IMAGING | Age: 32
Discharge: HOME | End: 2022-10-28
Attending: FAMILY MEDICINE
Payer: COMMERCIAL

## 2022-10-28 DIAGNOSIS — R51.9 SEVERE HEADACHE: Primary | ICD-10-CM

## 2022-10-28 DIAGNOSIS — R51.9 SEVERE HEADACHE: ICD-10-CM

## 2022-10-28 DIAGNOSIS — Z82.49 FAMILY HISTORY OF CEREBRAL ANEURYSM: ICD-10-CM

## 2022-10-28 LAB
CREAT BLD-MCNC: 0.7 MG/DL
GFR SERPLBLD BASED ON 1.73 SQ M-ARVRAT: 119 ML/MIN/1.73M2 (ref 60–?)

## 2022-10-28 PROCEDURE — 82565 ASSAY OF CREATININE: CPT

## 2022-10-28 PROCEDURE — 99214 OFFICE O/P EST MOD 30 MIN: CPT | Performed by: FAMILY MEDICINE

## 2022-10-28 PROCEDURE — 70496 CT ANGIOGRAPHY HEAD: CPT | Performed by: FAMILY MEDICINE

## 2022-11-04 ENCOUNTER — OFFICE VISIT (OUTPATIENT)
Dept: SURGERY | Facility: CLINIC | Age: 32
End: 2022-11-04
Payer: COMMERCIAL

## 2022-11-04 VITALS — DIASTOLIC BLOOD PRESSURE: 64 MMHG | SYSTOLIC BLOOD PRESSURE: 100 MMHG | HEART RATE: 76 BPM

## 2022-11-04 DIAGNOSIS — R20.0 NUMBNESS AND TINGLING IN RIGHT HAND: ICD-10-CM

## 2022-11-04 DIAGNOSIS — R51.9 NONINTRACTABLE EPISODIC HEADACHE, UNSPECIFIED HEADACHE TYPE: Primary | ICD-10-CM

## 2022-11-04 DIAGNOSIS — R20.2 NUMBNESS AND TINGLING IN RIGHT HAND: ICD-10-CM

## 2022-11-04 PROCEDURE — 3074F SYST BP LT 130 MM HG: CPT | Performed by: NEUROLOGICAL SURGERY

## 2022-11-04 PROCEDURE — 3078F DIAST BP <80 MM HG: CPT | Performed by: NEUROLOGICAL SURGERY

## 2022-11-04 PROCEDURE — 99203 OFFICE O/P NEW LOW 30 MIN: CPT | Performed by: NEUROLOGICAL SURGERY

## 2022-11-04 NOTE — PROGRESS NOTES
Patient here for evaluation with Dr. Elton Garcia. Patient completed imaging on 10/28/22:  CTA Brain. Patient reports having constant pressure headache at the back of her head. Patient takes OTC meds when mouth and hands go numb, she experiences vision changes, and experiences a heavy weight feeling on her head. Patient has times when she cannot find words about every 3 months.

## 2022-12-02 ENCOUNTER — TELEPHONE (OUTPATIENT)
Dept: FAMILY MEDICINE CLINIC | Facility: CLINIC | Age: 32
End: 2022-12-02

## 2022-12-02 NOTE — TELEPHONE ENCOUNTER
Confirmed patient requesting gene testing. Called Fortunato back. Patient requesting gene sight testing. Please advise on order. Thank you.

## 2022-12-02 NOTE — TELEPHONE ENCOUNTER
Fortunato called from gene testing and said the patient called requesting an order of Dr Maricarmen Deluna or the Gene Testing.       Call Fortunato at [phone]

## 2022-12-05 NOTE — TELEPHONE ENCOUNTER
PSR: Please assist patient in scheduling OV for gene sight testing. Thank you.  (Will need to be swabbed.)

## 2022-12-05 NOTE — TELEPHONE ENCOUNTER
Pt was also wondering about hormone testing  But she is calling her insurance to see if the gene sighting is covered before making an appt

## 2023-02-09 ENCOUNTER — TELEPHONE (OUTPATIENT)
Dept: NEUROLOGY | Facility: CLINIC | Age: 33
End: 2023-02-09

## 2023-02-09 ENCOUNTER — OFFICE VISIT (OUTPATIENT)
Dept: NEUROLOGY | Facility: CLINIC | Age: 33
End: 2023-02-09
Payer: COMMERCIAL

## 2023-02-09 VITALS
BODY MASS INDEX: 24 KG/M2 | RESPIRATION RATE: 16 BRPM | WEIGHT: 146 LBS | HEART RATE: 84 BPM | SYSTOLIC BLOOD PRESSURE: 112 MMHG | DIASTOLIC BLOOD PRESSURE: 74 MMHG | OXYGEN SATURATION: 99 %

## 2023-02-09 DIAGNOSIS — G93.5 CHIARI MALFORMATION TYPE I (HCC): ICD-10-CM

## 2023-02-09 DIAGNOSIS — G43.109 COMPLICATED MIGRAINE: Primary | ICD-10-CM

## 2023-02-09 PROCEDURE — 3074F SYST BP LT 130 MM HG: CPT | Performed by: OTHER

## 2023-02-09 PROCEDURE — 3078F DIAST BP <80 MM HG: CPT | Performed by: OTHER

## 2023-02-09 PROCEDURE — 99204 OFFICE O/P NEW MOD 45 MIN: CPT | Performed by: OTHER

## 2023-02-09 RX ORDER — SUMATRIPTAN 50 MG/1
TABLET, FILM COATED ORAL
Qty: 9 TABLET | Refills: 0 | Status: SHIPPED | OUTPATIENT
Start: 2023-02-09

## 2023-02-09 NOTE — PROGRESS NOTES
Patient referred by Dr. Justin Rodriguez transient episodes of  right hand numbness, word finding difficulty and left occipital headaches, which have been happening once every few months for about 10 years. These episodes last for a few days, with the worst of it lasting 8-12 hours, then spontaneously resolve, but patient feels drained after these episodes. Also reports sometimes getting \"tunnel vision\" and dizziness with mild nausea and uneasy feeling. Denies any sensitivity to light/sound/smell. Patient takes 800mg ibuprofen at start of episode (usually when right pinky starts to tingle) then sleeps. Patient also reports constant pressure headache in occipital region. Patient had recent CTA done ordered by PCP due to an episode, which demonstrated a chiari malformation. Dr. Justin Rodriguez does not believe these symptoms are consistent with chiari malformation, thus referral to neurology. Patient has presented with an FMLA form. She is requesting intermittent leave for when she has these episodes - once every third month, lasting 1-2 days.

## 2023-02-09 NOTE — TELEPHONE ENCOUNTER
Patient seen in office on 2/9/23. Presented with an FMLA form to be completed by Dr. Ryan Botello. She is requesting intermittent leave for when she has the episodes for which she is seeing Dr. Ryan Botello - once every third month, lasting 1-2 days. Completed form to be sent directly to patient's employer (on form), with copy scanned to patient's [email or web address]. Patient will need to complete an LETTY. Sent patient information via [employer] to download form from website, sign, and scan back to us. Form completed and signed by Dr. Ryan Botello. Awaiting LETTY from patient to send to employer. Original form placed in nursing folder in [place] until LETTY received. Copy sent to scanning.

## 2023-02-11 ENCOUNTER — HOSPITAL ENCOUNTER (OUTPATIENT)
Dept: MRI IMAGING | Facility: HOSPITAL | Age: 33
Discharge: HOME | End: 2023-02-11
Attending: Other
Payer: COMMERCIAL

## 2023-02-11 DIAGNOSIS — G43.109 COMPLICATED MIGRAINE: ICD-10-CM

## 2023-02-11 DIAGNOSIS — G93.5 CHIARI MALFORMATION TYPE I (HCC): ICD-10-CM

## 2023-02-11 PROCEDURE — 70551 MRI BRAIN STEM W/O DYE: CPT | Performed by: OTHER

## 2023-02-15 ENCOUNTER — NURSE ONLY (OUTPATIENT)
Dept: ELECTROPHYSIOLOGY | Facility: HOSPITAL | Age: 33
End: 2023-02-15
Attending: Other
Payer: COMMERCIAL

## 2023-02-15 DIAGNOSIS — G43.109 COMPLICATED MIGRAINE: ICD-10-CM

## 2023-02-15 PROCEDURE — 95812 EEG 41-60 MINUTES: CPT | Performed by: OTHER

## 2023-02-16 NOTE — PROCEDURES
Date of Procedure: 2/15/2023    Procedure: EEG (ELECTROENCEPHALOGRAM) 41-60 mins    DX: COMPLICATED MIGRAINE  HX: A 31 Y/O FEMALE PRESENTS WITH C/O HA AND SENSORY SYMPTOMS. PT STATES SHE HAS BEEN HAVING HEADACHES WITH SYMPTOMS CONSISTING OF RIGHT HAND NUMBNESS, WORD FINDING DIFFICULTY, DIZZINESS, NAUSEA AND  TUNNEL/BURRY VISION. PT FEELS VERY DRAINED AND SLEEPY AFTER. PT HAS BEEN GETTING THEM EVERY FEW MONTHS FOR ABOUT 10 YEARS BUT HAS BECOME MORE FREQUENT. NO ABNORMAL MVMTS OR INCONTIENCE REPORTED. NO HX OF SZ  PMH:ANEMIA, DEPRESSION, ASTHMA, MENTAL DISORDER, MIGRAINES  RX: SUMATRIPTAN  PREVIOUS EEG: NONE  CT: 10/28/22 NO ACUTE INTRACRANIAL ABNORMALITY IDENTIFIED. THE CEREBELLAR TONSILS EXTEND UP TO 4 MM BELOW THE LEVEL OF THE FORAMEN MAGNUM AND THERE IS MILD CROWDING OF THE FORAMEN MAGNUM SUGGESTING A CHIARI I MALFORMATION. THERE IS NO SIGNIFICANT CEREBELLAR TONSILLAR DYSMORPHIA. NO EVIDENCE OF INTRACRANIAL ANEURYSM, FLOW LIMITING STENOSIS OR FOCAL ARTERIAL OCCLUSION  MRI: 2/11/23 NO ACUTE PROCESS. NO HYDROCEPHALUS, SYRINGOBULBIA OR OTHER POTENTIAL COMPLICATIONS  PT STATE: A/OX4    BACKGROUND ACTIVITY: Posterior rhythm was in the range of 8-10 Hz, reactive to eye opening; symmetrical and synchronous. Noted also are generalized intermiittent slowing. Drowsiness is characterized by intermittent theta waves bitemporally. EPILEPTIFORM DISCHARGES: There were short lasting infrequent large amplitude slow sharp contoured waves in avrhfz-shctiv-ehlawzfl regions recorded . HYPERVENTILATION: Hyperventilation was performed with no change. PHOTIC STIMULATION: Photic stimulation was performed with no change. Spindles/K complex in frontocentral, parietal region seen. IMPRESSION: There were infrequent slow sharp contoured waves in hjesdn-nbpkxb-ecehlnxr regions seen but no convulsive or epileptiform seizure captured; Clinical correlation is advised.     Ollie Mirza MD   Neurology  [facility]  2/16/2023, 10:31 AM  CC: Xavier Pemberton MD

## 2023-03-16 ENCOUNTER — PATIENT MESSAGE (OUTPATIENT)
Dept: FAMILY MEDICINE CLINIC | Facility: CLINIC | Age: 33
End: 2023-03-16

## 2023-03-16 NOTE — TELEPHONE ENCOUNTER
From: Marv Harley  To: Xavier Pemberton MD  Sent: 3/16/2023 12:28 PM CDT  Subject: Testing    Is neurotransmitter testing something that I can have ordered? I do not feel like Zoloft is the best option for me and believe this may be related to my frequent headaches. Thank you.

## 2023-03-17 NOTE — TELEPHONE ENCOUNTER
No, we do not do neurotransmitter testing  Pls have pt follow up with OV for discussion of concerns, she is also due for follow up for depression

## 2023-03-31 ENCOUNTER — LAB ENCOUNTER (OUTPATIENT)
Dept: LAB | Age: 33
End: 2023-03-31
Attending: FAMILY MEDICINE
Payer: COMMERCIAL

## 2023-03-31 ENCOUNTER — OFFICE VISIT (OUTPATIENT)
Dept: FAMILY MEDICINE CLINIC | Facility: CLINIC | Age: 33
End: 2023-03-31
Payer: COMMERCIAL

## 2023-03-31 VITALS
DIASTOLIC BLOOD PRESSURE: 62 MMHG | WEIGHT: 149 LBS | OXYGEN SATURATION: 100 % | HEIGHT: 66 IN | HEART RATE: 92 BPM | SYSTOLIC BLOOD PRESSURE: 100 MMHG | RESPIRATION RATE: 16 BRPM | TEMPERATURE: 98 F | BODY MASS INDEX: 23.95 KG/M2

## 2023-03-31 DIAGNOSIS — B97.89 VIRAL RESPIRATORY ILLNESS: Primary | ICD-10-CM

## 2023-03-31 DIAGNOSIS — Z00.00 LABORATORY EXAM ORDERED AS PART OF ROUTINE GENERAL MEDICAL EXAMINATION: ICD-10-CM

## 2023-03-31 DIAGNOSIS — R53.83 FATIGUE, UNSPECIFIED TYPE: ICD-10-CM

## 2023-03-31 DIAGNOSIS — J98.8 VIRAL RESPIRATORY ILLNESS: Primary | ICD-10-CM

## 2023-03-31 LAB
ALBUMIN SERPL-MCNC: 4.2 G/DL (ref 3.4–5)
ALBUMIN/GLOB SERPL: 1.4 {RATIO} (ref 1–2)
ALP LIVER SERPL-CCNC: 71 U/L
ALT SERPL-CCNC: 21 U/L
ANION GAP SERPL CALC-SCNC: 4 MMOL/L (ref 0–18)
AST SERPL-CCNC: 15 U/L (ref 15–37)
BASOPHILS # BLD AUTO: 0.02 X10(3) UL (ref 0–0.2)
BASOPHILS NFR BLD AUTO: 0.3 %
BILIRUB SERPL-MCNC: 0.6 MG/DL (ref 0.1–2)
BUN BLD-MCNC: 12 MG/DL (ref 7–18)
CALCIUM BLD-MCNC: 9.6 MG/DL (ref 8.5–10.1)
CHLORIDE SERPL-SCNC: 107 MMOL/L (ref 98–112)
CHOLEST SERPL-MCNC: 139 MG/DL (ref ?–200)
CO2 SERPL-SCNC: 27 MMOL/L (ref 21–32)
CREAT BLD-MCNC: 0.86 MG/DL
EOSINOPHIL # BLD AUTO: 0.07 X10(3) UL (ref 0–0.7)
EOSINOPHIL NFR BLD AUTO: 1.2 %
ERYTHROCYTE [DISTWIDTH] IN BLOOD BY AUTOMATED COUNT: 13.9 %
FASTING PATIENT LIPID ANSWER: NO
FASTING STATUS PATIENT QL REPORTED: NO
GFR SERPLBLD BASED ON 1.73 SQ M-ARVRAT: 93 ML/MIN/1.73M2 (ref 60–?)
GLOBULIN PLAS-MCNC: 3 G/DL (ref 2.8–4.4)
GLUCOSE BLD-MCNC: 144 MG/DL (ref 70–99)
HCT VFR BLD AUTO: 38.7 %
HDLC SERPL-MCNC: 50 MG/DL (ref 40–59)
HGB BLD-MCNC: 12.3 G/DL
IMM GRANULOCYTES # BLD AUTO: 0.01 X10(3) UL (ref 0–1)
IMM GRANULOCYTES NFR BLD: 0.2 %
IRON SATN MFR SERPL: 23 %
IRON SERPL-MCNC: 82 UG/DL
LDLC SERPL CALC-MCNC: 74 MG/DL (ref ?–100)
LYMPHOCYTES # BLD AUTO: 2.03 X10(3) UL (ref 1–4)
LYMPHOCYTES NFR BLD AUTO: 35.3 %
MCH RBC QN AUTO: 23.6 PG (ref 26–34)
MCHC RBC AUTO-ENTMCNC: 31.8 G/DL (ref 31–37)
MCV RBC AUTO: 74.3 FL
MONOCYTES # BLD AUTO: 0.31 X10(3) UL (ref 0.1–1)
MONOCYTES NFR BLD AUTO: 5.4 %
NEUTROPHILS # BLD AUTO: 3.31 X10 (3) UL (ref 1.5–7.7)
NEUTROPHILS # BLD AUTO: 3.31 X10(3) UL (ref 1.5–7.7)
NEUTROPHILS NFR BLD AUTO: 57.6 %
NONHDLC SERPL-MCNC: 89 MG/DL (ref ?–130)
OSMOLALITY SERPL CALC.SUM OF ELEC: 288 MOSM/KG (ref 275–295)
PLATELET # BLD AUTO: 213 10(3)UL (ref 150–450)
POTASSIUM SERPL-SCNC: 3.8 MMOL/L (ref 3.5–5.1)
PROT SERPL-MCNC: 7.2 G/DL (ref 6.4–8.2)
RBC # BLD AUTO: 5.21 X10(6)UL
SODIUM SERPL-SCNC: 138 MMOL/L (ref 136–145)
TIBC SERPL-MCNC: 350 UG/DL (ref 240–450)
TRANSFERRIN SERPL-MCNC: 235 MG/DL (ref 200–360)
TRIGL SERPL-MCNC: 73 MG/DL (ref 30–149)
TSI SER-ACNC: 1.39 MIU/ML (ref 0.36–3.74)
VIT B12 SERPL-MCNC: 583 PG/ML (ref 193–986)
VIT D+METAB SERPL-MCNC: 30.5 NG/ML (ref 30–100)
VLDLC SERPL CALC-MCNC: 11 MG/DL (ref 0–30)
WBC # BLD AUTO: 5.8 X10(3) UL (ref 4–11)

## 2023-03-31 PROCEDURE — 84443 ASSAY THYROID STIM HORMONE: CPT

## 2023-03-31 PROCEDURE — 80053 COMPREHEN METABOLIC PANEL: CPT

## 2023-03-31 PROCEDURE — 3074F SYST BP LT 130 MM HG: CPT | Performed by: FAMILY MEDICINE

## 2023-03-31 PROCEDURE — 83550 IRON BINDING TEST: CPT

## 2023-03-31 PROCEDURE — 82607 VITAMIN B-12: CPT

## 2023-03-31 PROCEDURE — 80061 LIPID PANEL: CPT

## 2023-03-31 PROCEDURE — 3078F DIAST BP <80 MM HG: CPT | Performed by: FAMILY MEDICINE

## 2023-03-31 PROCEDURE — 82306 VITAMIN D 25 HYDROXY: CPT

## 2023-03-31 PROCEDURE — 85025 COMPLETE CBC W/AUTO DIFF WBC: CPT

## 2023-03-31 PROCEDURE — 99213 OFFICE O/P EST LOW 20 MIN: CPT | Performed by: FAMILY MEDICINE

## 2023-03-31 PROCEDURE — 3008F BODY MASS INDEX DOCD: CPT | Performed by: FAMILY MEDICINE

## 2023-03-31 PROCEDURE — 83540 ASSAY OF IRON: CPT

## 2023-03-31 PROCEDURE — 36415 COLL VENOUS BLD VENIPUNCTURE: CPT

## 2023-03-31 NOTE — PATIENT INSTRUCTIONS
Take sudafed (pseudoephedrine) unless you feel too jittery   You can take ibupofren for pain/swelling   If the OTC cold/sinus medication has acetaminophen in it - then only take ibuprofen for pain, not tylenol   Mucinex can be used if you do not tolerate sudafed   Try nasal steroid spray like flonase   If still having congestion or worsening sinus pain next week, call office   Start bupropion after you feel congestion improves

## 2023-05-02 ENCOUNTER — OFFICE VISIT (OUTPATIENT)
Dept: NEUROLOGY | Facility: CLINIC | Age: 33
End: 2023-05-02
Payer: COMMERCIAL

## 2023-05-02 VITALS
RESPIRATION RATE: 16 BRPM | HEART RATE: 72 BPM | SYSTOLIC BLOOD PRESSURE: 120 MMHG | WEIGHT: 149 LBS | DIASTOLIC BLOOD PRESSURE: 64 MMHG | BODY MASS INDEX: 24 KG/M2

## 2023-05-02 DIAGNOSIS — G43.109 COMPLICATED MIGRAINE: Primary | ICD-10-CM

## 2023-05-02 PROCEDURE — 99214 OFFICE O/P EST MOD 30 MIN: CPT | Performed by: OTHER

## 2023-05-02 PROCEDURE — 3078F DIAST BP <80 MM HG: CPT | Performed by: OTHER

## 2023-05-02 PROCEDURE — 3074F SYST BP LT 130 MM HG: CPT | Performed by: OTHER

## 2023-05-02 RX ORDER — BUTALBITAL, ACETAMINOPHEN AND CAFFEINE 50; 325; 40 MG/1; MG/1; MG/1
1 TABLET ORAL EVERY 8 HOURS PRN
Qty: 10 TABLET | Refills: 0 | Status: SHIPPED | OUTPATIENT
Start: 2023-05-02

## 2023-05-02 NOTE — PROGRESS NOTES
Pt states here for follow up. Pt states having a migraine today. Pt states having 4 headaches and Imetrex has helped for all migraines except today.

## 2023-11-28 ENCOUNTER — TELEPHONE (OUTPATIENT)
Dept: HEMATOLOGY/ONCOLOGY | Age: 33
End: 2023-11-28

## 2023-11-29 ENCOUNTER — TELEPHONE (OUTPATIENT)
Dept: HEMATOLOGY/ONCOLOGY | Age: 33
End: 2023-11-29

## 2023-11-29 ENCOUNTER — OFFICE VISIT (OUTPATIENT)
Dept: HEMATOLOGY/ONCOLOGY | Age: 33
End: 2023-11-29
Attending: SPECIALIST
Payer: COMMERCIAL

## 2023-11-29 VITALS
DIASTOLIC BLOOD PRESSURE: 81 MMHG | WEIGHT: 142.38 LBS | RESPIRATION RATE: 18 BRPM | OXYGEN SATURATION: 100 % | HEART RATE: 70 BPM | BODY MASS INDEX: 22.88 KG/M2 | HEIGHT: 66.1 IN | SYSTOLIC BLOOD PRESSURE: 114 MMHG | TEMPERATURE: 99 F

## 2023-11-29 DIAGNOSIS — D56.3 BETA THALASSEMIA TRAIT: Primary | ICD-10-CM

## 2023-11-29 PROCEDURE — 99204 OFFICE O/P NEW MOD 45 MIN: CPT | Performed by: SPECIALIST

## 2023-11-29 NOTE — PROGRESS NOTES
Patient is here today for Consult with Abimael Gonzalez for Abnormal Labs. Patient denies pain. Medication list and Medical history were reviewed and updated. Education Record    Learner:  Patient      Disease / Diagnosis: Consult    Barriers / Limitations:  None   Comments:    Method:  Brief focused, Discussion, Printed material and Reinforcement   Comments:    General Topics:  Medication, Pain, Procedure and Plan of care reviewed   Comments:    Outcome:  Shows understanding   Comments:      AVS provided and follow up reviewed. Patient instructed to call as needed.

## 2024-01-29 ENCOUNTER — OFFICE VISIT (OUTPATIENT)
Dept: NEUROLOGY | Facility: CLINIC | Age: 34
End: 2024-01-29
Payer: COMMERCIAL

## 2024-01-29 VITALS — SYSTOLIC BLOOD PRESSURE: 118 MMHG | RESPIRATION RATE: 16 BRPM | DIASTOLIC BLOOD PRESSURE: 70 MMHG | HEART RATE: 78 BPM

## 2024-01-29 DIAGNOSIS — G43.109 COMPLICATED MIGRAINE: Primary | ICD-10-CM

## 2024-01-29 DIAGNOSIS — G93.5 CHIARI MALFORMATION TYPE I (HCC): ICD-10-CM

## 2024-01-29 PROCEDURE — 3078F DIAST BP <80 MM HG: CPT | Performed by: OTHER

## 2024-01-29 PROCEDURE — 99214 OFFICE O/P EST MOD 30 MIN: CPT | Performed by: OTHER

## 2024-01-29 PROCEDURE — 3074F SYST BP LT 130 MM HG: CPT | Performed by: OTHER

## 2024-01-29 RX ORDER — UBROGEPANT 50 MG/1
TABLET ORAL
Qty: 3 TABLET | Refills: 0 | COMMUNITY
Start: 2024-01-29 | End: 2025-01-28

## 2024-01-29 NOTE — PROGRESS NOTES
Patient here to follow up regarding migraines. Having at least 1x per month, most 4 episodes per month.

## 2024-01-29 NOTE — PATIENT INSTRUCTIONS
Refill policies:    Allow 2-3 business days for refills; controlled substances may take longer.  Contact your pharmacy at least 5 days prior to running out of medication and have them send an electronic request or submit request through the “request refill” option in your FIGS account.  Refills are not addressed on weekends; covering physicians do not authorize routine medications on weekends.  No narcotics or controlled substances are refilled after noon on Fridays or by on call physicians.  By law, narcotics must be electronically prescribed.  A 30 day supply with no refills is the maximum allowed.  If your prescription is due for a refill, you may be due for a follow up appointment.  To best provide you care, patients receiving routine medications need to be seen at least once a year.  Patients receiving narcotic/controlled substance medications need to be seen at least once every 3 months.  In the event that your preferred pharmacy does not have the requested medication in stock (e.g. Backordered), it is your responsibility to find another pharmacy that has the requested medication available.  We will gladly send a new prescription to that pharmacy at your request.    Scheduling Tests:    If your physician has ordered radiology tests such as MRI or CT scans, please contact Central Scheduling at [phone] right away to schedule the test.  Once scheduled, the [facility] Centralized Referral Team will work with your insurance carrier to obtain pre-certification or prior authorization.  Depending on your insurance carrier, approval may take 3-10 days.  It is highly recommended patients assure they have received an authorization before having a test performed.  If test is done without insurance authorization, patient may be responsible for the entire amount billed.      Precertification and Prior Authorizations:  If your physician has recommended that you have a procedure or additional testing performed the [facility]  Centralized Referral Team will contact your insurance carrier to obtain pre-certification or prior authorization.    You are strongly encouraged to contact your insurance carrier to verify that your procedure/test has been approved and is a COVERED benefit.  Although the [facility] Centralized Referral Team does its due diligence, the insurance carrier gives the disclaimer that \"Although the procedure is authorized, this does not guarantee payment.\"    Ultimately the patient is responsible for payment.   Thank you for your understanding in this matter.  Paperwork Completion:  If you require FMLA or disability paperwork for your recovery, please make sure to either drop it off or have it faxed to our office at [phone]. Be sure the form has your name and date of birth on it.  The form will be faxed to our Forms Department and they will complete it for you.  There is a 25$ fee for all forms that need to be filled out.  Please be aware there is a 10-14 day turnaround time.  You will need to sign a release of information (LETTY) form if your paperwork does not come with one.  You may call the Forms Department with any questions at [phone].  Their fax number is [phone].

## 2024-01-29 NOTE — PROGRESS NOTES
[facility] Neurology Outpatient Progress Note  Date of service: 1/29/2024    Assessment:   Complicated migraine  (primary encounter diagnosis)  Chiari malformation type I      Plan:  MRI BRAIN (CPT=70551) reviewed, no action required  imitrex does not always help, offered ubrelvy sample  fioricet prn  Headache diary advised, may consider preventive medication for prolonged or more frequent attack in the future, topamax is a good option  EEG reviewed  See orders and medications filed with this encounter. The patient indicates understanding of these issues and agrees with the plan.  Discussed with patient regarding assessment, care plan   RTC 9 months  Pt should go ER for any new or worsening symptoms and contact office    Subjective:   History:  Pt states here for follow up. She did great job in keeping headache jounral, overall 1-3 MMD, December had 4; imitrex does not always help but fioricet helps.   Per initial visit note:  Beatriz Harley is a 32 year old female with past medical history as listed below presents here for initial evaluation of headache, sensory symptoms;  Patient referred by Dr. Ayala transient episodes of  right hand numbness, word finding difficulty and left occipital headaches, which have been happening once every few months for about 10 years.  These episodes last for 3 days, with the worst of it lasting 8-12 hours, then spontaneously resolve, but patient feels drained after these episodes. Also reports sometimes getting \"tunnel vision\" and dizziness with mild nausea and uneasy feeling.  Denies any sensitivity to light/sound/smell.  Patient takes 800mg ibuprofen at start of episode (usually when right pinky starts to tingle) then sleeps.  Patient also reports constant pressure headache in occipital region. Patient had recent CTA done ordered by PCP due to an episode, which demonstrated a chiari malformation.  Dr. Ayala does not believe these symptoms are consistent with chiari  malformation, thus referral to neurology. Patient has presented with an FMLA form.  She is requesting intermittent leave for when she has these episodes - once every third month, lasting 1-2 days.    History/Other:   REVIEW OF SYSTEMS:  A 10-point system was reviewed. Pertinent positives and negatives are noted as above       Current Outpatient Medications:     butalbital-acetaminophen-caffeine -40 MG Oral Tab, Take 1 tablet by mouth every 8 (eight) hours as needed for Pain., Disp: 10 tablet, Rfl: 0    SUMAtriptan 50 MG Oral Tab, Use at onset; repeat once after 2 hours-ONLY 2 IN 24 HR MAX. This is a 30 day supply., Disp: 9 tablet, Rfl: 0    cholecalciferol (VITAMIN D3) 125 MCG (5000 UT) Oral Cap, Take 1 capsule (5,000 Units total) by mouth daily. (Patient not taking: Reported on 2024), Disp: , Rfl:     Multiple Vitamins-Minerals (MULTIVITAMIN ADULTS) Oral Tab, Take by mouth. (Patient not taking: Reported on 2024), Disp: , Rfl:   Allergies:  Allergies   Allergen Reactions    Hydrocodone FATIGUE, HIVES and ITCHING     Past Medical History:   Diagnosis Date    Amenorrhea     IUD    Anemia     taking iron    Anxiety     Congenital hip dysplasia     Depression     No meds    Dysmenorrhea     Extrinsic asthma, unspecified     Mental disorder     anxiety    Migraines     Pap smear for cervical cancer screening 2014    abnormal cells at 3o'clock    Papanicolaou smear of cervix with low grade squamous intraepithelial lesion (LGSIL) 2014    Pneumonia, organism unspecified(486)     as child     Past Surgical History:   Procedure Laterality Date    ADENOIDECTOMY  1997          ,,    COLPOSCOPY, CERVIX W/UPPER ADJACENT VAGINA; W/BIOPSY(S), CERVIX      3/2014    MIRENA, IUD      TONSILLECTOMY       Social History:  Social History     Tobacco Use    Smoking status: Never     Passive exposure: Past    Smokeless tobacco: Never   Substance Use Topics    Alcohol use: Yes      Comment: Rare 1 x month     Family History   Problem Relation Age of Onset    Psychiatric Father     Other (Other) Father         blood disorder    Bleeding Disorders Father         Clotting condition    Depression Father     Hypertension Father     Stroke Father     Anxiety Father     Cancer Mother 30 - 39        Cervical Cancer    Psychiatric Mother     Depression Mother     Anxiety Mother     Fibromyalgia Mother     Migraines Mother     Neurological Disorder Maternal Grandmother          from brain aneursym    Bleeding Disorders Maternal Grandmother         Clotting condition    Stroke Maternal Grandmother     Heart Disorder Paternal Grandmother         Heart Attacks / Pace Maker    Cancer Paternal Grandmother 60 - 69        Uterine Cancer    Stroke Paternal Grandmother     Heart Attack Paternal Grandmother     Bleeding Disorders Paternal Grandfather         Clotting condition    Cancer Paternal Grandfather 50 - 59        Esophageal Cancer      Objective:   Neurological Examination:  /70   Pulse 78   Resp 16   Mental status: A & O X 3  Language: no aphasia  Speech: no dysarthria  CN II-XII: intact   Motor strength: 5/5 all extremities  Tone: normal  DTRs: 2+ symmetric  Coordination: normal  Sensory: symmetric  Gait: normal    Test reviewed on 2024      Reuben Dugan MD ([place])  Neurology  [facility]  2024, 3:46 PM  CC: ERIC HOLLINS MD

## 2024-01-30 ENCOUNTER — TELEPHONE (OUTPATIENT)
Dept: NEUROLOGY | Facility: CLINIC | Age: 34
End: 2024-01-30

## 2024-01-30 NOTE — TELEPHONE ENCOUNTER
Rec'd [facility] paperwork for completion; quentin [place]. Original paperwork sent via inter office mail to forms dept, [facility] 4th floor and emailed to [employer]. Endorsed to EM Forms.

## 2024-02-07 NOTE — TELEPHONE ENCOUNTER
FMLA with valid authorization ([facility]) received at Forms Department. Logged for processing.

## 2024-10-29 ENCOUNTER — OFFICE VISIT (OUTPATIENT)
Dept: NEUROLOGY | Facility: CLINIC | Age: 34
End: 2024-10-29
Payer: COMMERCIAL

## 2024-10-29 VITALS
SYSTOLIC BLOOD PRESSURE: 110 MMHG | BODY MASS INDEX: 21 KG/M2 | RESPIRATION RATE: 16 BRPM | DIASTOLIC BLOOD PRESSURE: 62 MMHG | WEIGHT: 130 LBS | HEART RATE: 65 BPM

## 2024-10-29 DIAGNOSIS — G93.5 CHIARI MALFORMATION TYPE I (HCC): ICD-10-CM

## 2024-10-29 DIAGNOSIS — G43.109 COMPLICATED MIGRAINE: Primary | ICD-10-CM

## 2024-10-29 PROCEDURE — 99214 OFFICE O/P EST MOD 30 MIN: CPT | Performed by: OTHER

## 2024-10-29 RX ORDER — SUMATRIPTAN 50 MG/1
TABLET, FILM COATED ORAL
Qty: 9 TABLET | Refills: 3 | Status: SHIPPED | OUTPATIENT
Start: 2024-10-29

## 2024-10-29 RX ORDER — BUTALBITAL, ACETAMINOPHEN AND CAFFEINE 50; 325; 40 MG/1; MG/1; MG/1
1 TABLET ORAL EVERY 8 HOURS PRN
Qty: 10 TABLET | Refills: 3 | Status: SHIPPED | OUTPATIENT
Start: 2024-10-29

## 2024-10-29 NOTE — PROGRESS NOTES
[facility] Neurology Outpatient Progress Note  Date of service: 10/29/2024    Assessment:     ICD-10-CM    1. Complicated migraine  G43.109       2. Chiari malformation type I (HCC)  G93.5         Stable     Plan:  MRI BRAIN (CPT=70551) reviewed, no action required  Imitrex or fioricet prn (pt does not like ubrelvy)  Headache diary advised, may consider preventive medication for prolonged or more frequent attack in the future, topamax is a good option  See orders and medications filed with this encounter. The patient indicates understanding of these issues and agrees with the plan.  Discussed with patient regarding assessment, care plan   RTC 12 months  Pt should go ER for any new or worsening symptoms and contact office        Subjective:   History:  Pt states here for follow up for migraines. imitrex or fioricet help acute attacks, imitrex does not always help sudden onset migraine.   She does not like ubrelvy .  Per initial visit note:  Beatriz Harley is a 32 year old female with past medical history as listed below presents here for initial evaluation of headache, sensory symptoms;  Patient referred by Dr. Ayala transient episodes of  right hand numbness, word finding difficulty and left occipital headaches, which have been happening once every few months for about 10 years.  These episodes last for 3 days, with the worst of it lasting 8-12 hours, then spontaneously resolve, but patient feels drained after these episodes. Also reports sometimes getting \"tunnel vision\" and dizziness with mild nausea and uneasy feeling.  Denies any sensitivity to light/sound/smell.  Patient takes 800mg ibuprofen at start of episode (usually when right pinky starts to tingle) then sleeps.  Patient also reports constant pressure headache in occipital region. Patient had recent CTA done ordered by PCP due to an episode, which demonstrated a chiari malformation.  Dr. Ayala does not believe these symptoms are consistent with  chiari malformation, thus referral to neurology. Patient has presented with an FMLA form.  She is requesting intermittent leave for when she has these episodes - once every third month, lasting 1-2 days.    History/Other:   REVIEW OF SYSTEMS:  A 10-point system was reviewed. Pertinent positives and negatives are noted as above       Current Outpatient Medications:     butalbital-acetaminophen-caffeine -40 MG Oral Tab, Take 1 tablet by mouth every 8 (eight) hours as needed for Pain., Disp: 10 tablet, Rfl: 0    SUMAtriptan 50 MG Oral Tab, Use at onset; repeat once after 2 hours-ONLY 2 IN 24 HR MAX. This is a 30 day supply., Disp: 9 tablet, Rfl: 0    cholecalciferol (VITAMIN D3) 125 MCG (5000 UT) Oral Cap, Take 1 capsule (5,000 Units total) by mouth daily., Disp: , Rfl:     Multiple Vitamins-Minerals (MULTIVITAMIN ADULTS) Oral Tab, Take by mouth., Disp: , Rfl:   Allergies:  Allergies[1]  Past Medical History:    Amenorrhea    IUD    Anemia    taking iron    Anxiety    Congenital hip dysplasia (HCC)    Depression    No meds    Dysmenorrhea    Extrinsic asthma, unspecified    Mental disorder    anxiety    Migraines    Pap smear for cervical cancer screening    abnormal cells at 3o'clock    Papanicolaou smear of cervix with low grade squamous intraepithelial lesion (LGSIL)    Pneumonia, organism unspecified(486)    as child     Past Surgical History:   Procedure Laterality Date    Adenoidectomy  1997          ,,    Colposcopy, cervix w/upper adjacent vagina; w/biopsy(s), cervix      3/2014    Mirena, iud      Tonsillectomy       Social History:  Social History     Tobacco Use    Smoking status: Never     Passive exposure: Past    Smokeless tobacco: Never   Substance Use Topics    Alcohol use: Yes     Comment: Rare 1 x month     Family History   Problem Relation Age of Onset    Psychiatric Father     Other (Other) Father         blood disorder    Bleeding Disorders Father         Clotting  condition    Depression Father     Hypertension Father     Stroke Father     Anxiety Father     Cancer Mother 30 - 39        Cervical Cancer    Psychiatric Mother     Depression Mother     Anxiety Mother     Fibromyalgia Mother     Migraines Mother     Neurological Disorder Maternal Grandmother          from brain aneursym    Bleeding Disorders Maternal Grandmother         Clotting condition    Stroke Maternal Grandmother     Heart Disorder Paternal Grandmother         Heart Attacks / Pace Maker    Cancer Paternal Grandmother 60 - 69        Uterine Cancer    Stroke Paternal Grandmother     Heart Attack Paternal Grandmother     Bleeding Disorders Paternal Grandfather         Clotting condition    Cancer Paternal Grandfather 50 - 59        Esophageal Cancer        Objective:   Neurological Examination:  Vitals:    10/29/24 1532   BP: 110/62   Pulse: 65   Resp: 16     Mental status: A & O X 3  Language: no aphasia  Speech: no dysarthria  CN II-XII: intact   Motor strength: 5/5 all extremities  Tone: normal  DTRs: 2+ symmetric  Coordination: normal  Sensory: symmetric  Gait: normal    Test reviewed on 10/29/2024      Reuben \"Carlos\" MD [place]  Neurology  [facility]  10/29/2024, 3:36 PM  CC: ERIC HOLLINS MD         [1]   Allergies  Allergen Reactions    Hydrocodone FATIGUE, HIVES and ITCHING

## 2024-10-29 NOTE — PATIENT INSTRUCTIONS
Refill policies:    Allow 2-3 business days for refills; controlled substances may take longer.  Contact your pharmacy at least 5 days prior to running out of medication and have them send an electronic request or submit request through the “request refill” option in your [employer] account.  Refills are not addressed on weekends; covering physicians do not authorize routine medications on weekends.  No narcotics or controlled substances are refilled after noon on Fridays or by on call physicians.  By law, narcotics must be electronically prescribed.  A 30 day supply with no refills is the maximum allowed.  If your prescription is due for a refill, you may be due for a follow up appointment.  To best provide you care, patients receiving routine medications need to be seen at least once a year.  Patients receiving narcotic/controlled substance medications need to be seen at least once every 3 months.  In the event that your preferred pharmacy does not have the requested medication in stock (e.g. Backordered), it is your responsibility to find another pharmacy that has the requested medication available.  We will gladly send a new prescription to that pharmacy at your request.    Scheduling Tests:    If your physician has ordered radiology tests such as MRI or CT scans, please contact Central Scheduling at [phone] right away to schedule the test.  Once scheduled, the [facility] Centralized Referral Team will work with your insurance carrier to obtain pre-certification or prior authorization.  Depending on your insurance carrier, approval may take 3-10 days.  It is highly recommended patients assure they have received an authorization before having a test performed.  If test is done without insurance authorization, patient may be responsible for the entire amount billed.      Precertification and Prior Authorizations:  If your physician has recommended that you have a procedure or additional testing performed the [facility]  Centralized Referral Team will contact your insurance carrier to obtain pre-certification or prior authorization.    You are strongly encouraged to contact your insurance carrier to verify that your procedure/test has been approved and is a COVERED benefit.  Although the [facility] Centralized Referral Team does its due diligence, the insurance carrier gives the disclaimer that \"Although the procedure is authorized, this does not guarantee payment.\"    Ultimately the patient is responsible for payment.   Thank you for your understanding in this matter.  Paperwork Completion:  If you require FMLA or disability paperwork for your recovery, please make sure to either drop it off or have it faxed to our office at [phone]. Be sure the form has your name and date of birth on it.  The form will be faxed to our Forms Department and they will complete it for you.  There is a 25$ fee for all forms that need to be filled out.  Please be aware there is a 10-14 day turnaround time.  You will need to sign a release of information (LETTY) form if your paperwork does not come with one.  You may call the Forms Department with any questions at [phone].  Their fax number is [phone].

## 2025-01-15 ENCOUNTER — TELEPHONE (OUTPATIENT)
Dept: FAMILY MEDICINE CLINIC | Facility: CLINIC | Age: 35
End: 2025-01-15

## 2025-02-11 ENCOUNTER — TELEPHONE (OUTPATIENT)
Dept: NEUROLOGY | Facility: CLINIC | Age: 35
End: 2025-02-11

## 2025-02-11 NOTE — TELEPHONE ENCOUNTER
Received Inter[place] Family Medical Leave Act forms in the forms department via fax. Sent Synker Message for authorization and logged for processing.     Provider notes state flare ups 1-3 per month  lasting 1- days.

## 2025-02-27 NOTE — TELEPHONE ENCOUNTER
Dr. Dugan,    *ACKNOWLEDGE BUTTON HAS BEEN MOVED TO THE TOP RIGHT RIBBON*    Please sign off on form if you agree to: recertification Family Medical Leave Act for migraines    -Signature page will be the first page scanned  -From your Inbasket, Highlight the patient and click Chart   -Double click the 02/11/2025 Forms Completion telephone encounter  -Scroll down to the Media section   -Click the blue Hyperlink: recertification Family Medical Leave Act Dr. Dugan 2/27/2025  -Click Acknowledge located in the top right ribbon/menu   -Drag the mouse into the blank space of the document and a + sign will appear. Left click to   electronically sign the document.  -Once signed, simply exit out of the screen and you signature will be saved.     Thank you,    Michelle

## 2025-02-28 NOTE — TELEPHONE ENCOUNTER
Please advise pt that she will need to see me every ~3 month (not once a year) for evaluation of FMLA eligibility if she wants me to sign such as form in the future. I will need to see her first and I would like to see her headache journal next visit. Please make appt with me and can be placed on cancellation list.

## 2025-03-06 NOTE — TELEPHONE ENCOUNTER
Patient called and informed of  Dr. Dugan's message. Patient verbalized understanding. Processing agent informed to move forms to \"filing cabinet\"

## 2025-03-06 NOTE — TELEPHONE ENCOUNTER
Left message to call back regarding Dr Dugan's message about her request for Family Medical Leave Act forms, if patient calls back please advise the patient that  would like to see patient and to bring headache journal Dr wants to see patient every 3 months not 1 time a year.  has not signed forms.

## 2025-05-08 DIAGNOSIS — F90.2 ATTENTION DEFICIT HYPERACTIVITY DISORDER (ADHD), COMBINED TYPE: ICD-10-CM

## 2025-05-08 NOTE — TELEPHONE ENCOUNTER
Please review; protocol failed/No Protocol      Recent Fills: 01/21/2025, 02/20/2025, 03/22/2025    Last Rx Written: 01/21/2025    Last Office Visit: Telemedicine 04/15/2025

## 2025-05-08 NOTE — TELEPHONE ENCOUNTER
Beatriz Harley requesting Medication Refill for:    Medication name and dose (copy and paste from medication list): lisdexamfetamine (VYVANSE) 30 MG Oral Cap      If medication is not on medication list - transfer patient to RN queue for triage    Preferred Pharmacy: [id number]     LOV: 4/15/2025   Last Refill date: 3/24/25  Next Scheduled appointment:

## 2025-05-09 RX ORDER — LISDEXAMFETAMINE DIMESYLATE 30 MG/1
30 CAPSULE ORAL DAILY
Qty: 30 CAPSULE | Refills: 0 | Status: SHIPPED | OUTPATIENT
Start: 2025-05-09 | End: 2025-06-08

## 2025-06-07 ENCOUNTER — PATIENT MESSAGE (OUTPATIENT)
Dept: FAMILY MEDICINE CLINIC | Facility: CLINIC | Age: 35
End: 2025-06-07

## 2025-06-09 NOTE — TELEPHONE ENCOUNTER
LOV 3/31/23     Future Appointments   Date Time Provider Department Center   7/12/2025 10:00 AM Miesha Finnegan MD [facility] [place]

## 2025-06-10 NOTE — TELEPHONE ENCOUNTER
[employer] message read. No call back.     Attempted to call patient. Left voicemail to call back to discuss symptoms with a nurse

## 2025-07-25 DIAGNOSIS — F90.2 ATTENTION DEFICIT HYPERACTIVITY DISORDER (ADHD), COMBINED TYPE: ICD-10-CM

## 2025-07-28 RX ORDER — LISDEXAMFETAMINE DIMESYLATE 30 MG/1
30 CAPSULE ORAL DAILY
Qty: 30 CAPSULE | Refills: 0 | OUTPATIENT
Start: 2025-07-28 | End: 2025-08-27

## 2025-07-28 NOTE — TELEPHONE ENCOUNTER
04/15/2025 telemedicine - Dr. Finnegan    Additional Order Information    Multidose Package Dispensed: No Cost ID: -- Admin Qty: 30 mg    NDC #: -- NDC Qty: --    Calculation: --     Pharmacy Actions and Admin    [facility] Actions     MAR Comment Waste Waste Unit          Panel Details for VYVANSE 30 MG 90 DAY PANEL    Outpatient Medication Quantity Refills Start End   lisdexamfetamine (VYVANSE) 30 MG Oral Cap () 30 capsule 0 2025   Sig:   Take 1 capsule (30 mg total) by mouth daily.     Route:   Oral     Earliest Fill Date:   2025     lisdexamfetamine (VYVANSE) 30 MG Oral Cap 30 capsule 0 2025   Sig:   Take 1 capsule (30 mg total) by mouth daily.     Route:   Oral     Earliest Fill Date:   2025     lisdexamfetamine (VYVANSE) 30 MG Oral Cap 30 capsule 0 2025   Sig:   Take 1 capsule (30 mg total) by mouth daily.     Route:   Oral     Earliest Fill Date:   2025       Panel Detail for VYVANSE 30 MG 90 DAY PANEL    Outpatient Medication Detail     Disp Refills Start End    lisdexamfetamine (VYVANSE) 30 MG Oral Cap 30 capsule 0 2025    Sig - Route: Take 1 capsule (30 mg total) by mouth daily. - Oral    Sent to pharmacy as: Lisdexamfetamine Dimesylate 30 MG Oral Capsule (Vyvanse)    Earliest Fill Date: 2025    E-Prescribing Status: Receipt confirmed by pharmacy (2025 12:21 PM CDT)      Other Panel Orders      Outpatient Medications     Disp Refills Start End    lisdexamfetamine (VYVANSE) 30 MG Oral Cap 30 capsule 0 2025    Sig - Route: Take 1 capsule (30 mg total) by mouth daily. - Oral    Sent to pharmacy as: Lisdexamfetamine Dimesylate 30 MG Oral Capsule (Vyvanse)    Earliest Fill Date: 2025    E-Prescribing Status: Receipt confirmed by pharmacy (2025 12:21 PM CDT)    lisdexamfetamine (VYVANSE) 30 MG Oral Cap 30 capsule 0 2025    Sig - Route: Take 1 capsule (30 mg total) by mouth daily. -  Oral    Sent to pharmacy as: Lisdexamfetamine Dimesylate 30 MG Oral Capsule (Vyvanse)    Earliest Fill Date: 8/11/2025    E-Prescribing Status: Receipt confirmed by pharmacy (6/12/2025 12:21 PM CDT)        Associated Diagnoses    Attention deficit hyperactivity disorder (ADHD), combined type  - Primary        Pharmacy    [facility] #[id number] - [place], [place] - [address] [facility] AT [facility] [address], [phone], [phone]

## (undated) NOTE — LETTER
[employer] for Jasmine Ellington     : 1990     Date: 2019  Provider:  Carol Mccallum MD  Phone for doctor or clinic: [address] [facility],  [address] [place] [address] [address]  [facility] [address]  [phone]    ACT Score: 25

## (undated) NOTE — LETTER
Beatriz Harley, GGW:9/[birth year]66    CONSENT FOR PROCEDURE/SEDATION    1. I authorize the performance upon Beatriz Harley  the following: Insertion Intrauterine Device Mirena    2.  I authorize SLIME Bustillo (and whomever is designated as th Relationship to patient: ____________________________________________    Witness: _________________________________________ Date:___________     Physician Signature: _______________________________ Date:___________

## (undated) NOTE — LETTER
Beatriz Harley, [facility]:2/34/0908    CONSENT FOR PROCEDURE/SEDATION    1. I authorize the performance upon [address] [place] [address]  the following: Intrauterine Device Paraguard    2.  I authorize Dr. Brandee Cochran MD (and whomever is designated as the doctor Relationship to patient: ____________________________________________    Witness: _________________________________________ Date:___________     Physician Signature: _______________________________ Date:___________

## (undated) NOTE — MR AVS SNAPSHOT
[facility] [address], [facility] [address]92 [address] [facility]  [phone]               Thank you for choosing us for your health care visit with Nakul Bell MD.  We are glad to serve you and happy to provide you with this These medications were sent to [facility] [address] [place],[address], [address] AT [address], [phone], [address] [place] [id number]    Hours:  24-hours Phone:  [phone]    Shayy Escalante

## (undated) NOTE — LETTER
04/10/18    Re: Alma Perez  : 1990      To Whom It May Concern:  Alma Perze is under my care and has restrictions to half time, at 6 hour days until further notice. She is having difficulty with her pregnancy at this time.   If yo

## (undated) NOTE — Clinical Note
Beatriz Landers, PYL:6/42/0497    CONSENT FOR PROCEDURE/SEDATION    1. I authorize the performance upon Beatriz Landers  the following: IUD Removal    2.  I authorize Dr. Chilo De Leon MD (and whomever is designated as the doctor’s assistant), to perfor Witness: _________________________________________ Date:___________     Physician Signature: _______________________________ Date:___________

## (undated) NOTE — LETTER
11/01/19        Beatriz Harley  [address] [address]      Dear Jasvir Cabello,    [address] records indicate that you have outstanding lab work and or testing that was ordered for you and has not yet been completed:  Orders Placed This Encounter

## (undated) NOTE — LETTER
Beatriz Harley, KDM:8/18/0504    CONSENT FOR PROCEDURE/SEDATION    1. I authorize the performance upon Beatriz Harley  the following: Intrauterine Device Removal    2.  I authorize SLIME Salgado (and whomever is designated as the [id number] Relationship to patient: ____________________________________________    Witness: _________________________________________ Date:___________     Physician Signature: _______________________________ Date:___________

## (undated) NOTE — ED AVS SNAPSHOT
Heather Runner   MRN: [id number]    Department:  THE [facility] Emergency Department in [place]   Date of Visit:  12/4/2017           Disclosure     Insurance plans vary and the physician(s) referred by the ER may not be covered by your plan.  Please co tell this physician (or your personal doctor if your instructions are to return to your personal doctor) about any new or lasting problems. The primary care or specialist physician will see patients referred from the [facility] Emergency Department.  Prem Martinez

## (undated) NOTE — LETTER
[address] [place] [address]  [phone]     Patient: Darius Suarez   YOB: 1990   Date of Visit: 11/2/2020     Dear Employer,        November 2, 2020    At [facility], we are otis symptoms may discontinue isolation and other precautions 10 days after the date of their first positive RT-PCR test for SARS-CoV-2 RNA.     Persons who are asymptomatic but have been exposed, CDC recommends 14 days of quarantine after exposure based on the

## (undated) NOTE — LETTER
08/19/21      To whom it may concern,    Sayra Tom is a patient under my care. She is exempt from receiving the COVID19 vaccine due to her medical history. Please contact me for any further questions or concerns.      Ann Luong MD  Family [facility]

## (undated) NOTE — LETTER
[facility]  [address] [facility], [address] [place] [address]    Consent for Operation    Date: __________________    Time: _______________    1.  I authorize the performance upon Violet Hood the following operation:                              Chey Medina original videotape. The [facility] will not be responsible for storage or maintenance of this tape. 6. For the purpose of advancing medical education, I consent to the admittance of observers to the Operating Room.     7. I authorize the use of any specime ___________________________    When the patient is a minor or mentally incompetent to give consent:  Signature of person authorized to consent for patient: ___________________________   Relationship to patient: _____________________________________________ understand how the anesthesia medicine will help me (benefits). 4. I understand that with any type of anesthesia medicine there are risks:  a.  The most common risks are: nausea, vomiting, sore throat, muscle soreness, damage to my eyes, mouth, or teeth services.     _____________________________________________________________________________  Witness        Date   Time  I have verified that the signature is that of the patient or patient’s representative, and that it was signed before the procedure    Pa

## (undated) NOTE — MR AVS SNAPSHOT
[facility] [address], [facility] 769 [address] [facility]  [phone]               Thank you for choosing us for your health care visit with Marisol Fuller MD.  We are glad to serve you and happy to provide you with this 94/62 mmHg 84 66.5\" 122 lb 19.40 kg/m2         Current Medications          This list is accurate as of: 4/5/17  3:32 PM.  Always use your most recent med list.                Albuterol Sulfate  (90 Base) MCG/ACT Aers   Inhale 2 puffs into the [facility]

## (undated) NOTE — MR AVS SNAPSHOT
After Visit Summary   2/15/2023    Anjali Yanez   MRN: [id number]           Visit Information     Date & Time  2/15/2023  1:30 PM Provider  [address] [place] [address] EEG Dept. Phone  [phone]      Allergies as of 2/15/2023  Review status set to Review Complete on 2023       Noted Reaction Type Reactions    Hydrocodone 2018    FATIGUE, HIVES, ITCHING      Your Current Medications        Dosage    SUMAtriptan 50 MG Oral Tab Use at onset; repeat once after 2 hours-ONLY 2 IN 24 HR MAX. This is a 30 day supply. cholecalciferol (VITAMIN D3) 125 MCG (5000 UT) Oral Cap Take 1 capsule by mouth daily. sertraline 50 MG Oral Tab () Take 1.5 tablets (75 mg total) by mouth daily. Multiple Vitamins-Minerals (MULTIVITAMIN ADULTS) Oral Tab Take by mouth. Diagnoses for This Visit    Complicated migraine   [[id number]]             We Ordered the Following     Normal Orders This Visit    EEG [NEU4 CUSTOM]       Future Appointments        Provider Department    2023 3:40 PM [facility], [address] [place] [address]                Did you know that [facility] primary care physicians now offer Video Visits through [address] for adult patients for a variety of conditions such as allergies, back pain and cold symptoms? Skip the drive and waiting room and online chat with a doctor face-to-face using your web-cam enabled computer or mobile device wherever you are. Video Visits cost $50 and can be paid hassle-free using a credit, debit, or health savings card. Not active on [employer]? Ask us how to get signed up today! If you receive a survey from [employer], please take a few minutes to complete it and provide feedback. We strive to deliver the best patient experience and are looking for ways to make improvements. Your feedback will help us do so. For more information on [employer], please visit [email or web address]           No text in SmartText           No text in SmartText